# Patient Record
Sex: MALE | Race: WHITE | NOT HISPANIC OR LATINO | Employment: OTHER | URBAN - METROPOLITAN AREA
[De-identification: names, ages, dates, MRNs, and addresses within clinical notes are randomized per-mention and may not be internally consistent; named-entity substitution may affect disease eponyms.]

---

## 2021-01-13 DIAGNOSIS — Z23 NEED FOR VACCINATION: ICD-10-CM

## 2023-08-04 ENCOUNTER — TELEPHONE (OUTPATIENT)
Dept: CARDIOLOGY | Facility: MEDICAL CENTER | Age: 83
End: 2023-08-04
Payer: COMMERCIAL

## 2023-08-04 NOTE — TELEPHONE ENCOUNTER
Referral from: Cristine Aquino PAC for sev AS    Patient called on 8/4/2023. Called patient on mobile [phone] but unable to reach or LVM due to mailbox not set up. Home number [phone] is disconnected (removed from chart).     STAT Echo, CTA, and cath (if available) images/records requested from [id number]    First attempt

## 2023-08-04 NOTE — LETTER
August 8, 2023        Parvez Marmolejo  [address] [place] [address]        Dear Parvez:    We have been unable to reach you regarding a referral to our structural heart program for evaluation of your cardiac valvular disease, placed by Cristine PEOPLES.      Please call our office at [phone] to schedule a consultation.           Sincerely,           PEMA SimeonN, RN  Structural Heart Program Nurse Coordinator

## 2023-08-08 NOTE — TELEPHONE ENCOUNTER
Tried calling patient again but unable to reach or LVM due to mailbox not set up.    Called [facility] office at [phone] and spoke to Cristine to see if they have a better contact number for the patient. Unfortunately, they do not have a different number for the patient. Confirmed patient's address on file and advised I would mail him a letter. Also requested that Cristine place a note in the patient's referral that we have been unable to reach him to schedule. Left my direct number for Cristine to put in the notes in case the patient calls to check on the status.    Third attempt, letter mailed

## 2023-08-16 ENCOUNTER — HOSPITAL ENCOUNTER (OUTPATIENT)
Dept: RADIOLOGY | Facility: MEDICAL CENTER | Age: 83
End: 2023-08-16
Payer: COMMERCIAL

## 2023-08-17 NOTE — TELEPHONE ENCOUNTER
CTA images imported into Epic but unfortunately the echo images are at 0. Called OP filmroom to notify and was advised they'll work on it.

## 2023-08-22 NOTE — TELEPHONE ENCOUNTER
Received a call from [facility] inquiring if the patient was able to get scheduled. Advised I was unable to reach him/LVM x3 and I mailed a letter 8/8 without a response. She states they have been unable to reach him either. She states at this time, they are going to cancel the referral, so if the patient calls back to schedule, please direct him back to [facility] for another referral.

## 2023-10-26 ENCOUNTER — TELEPHONE (OUTPATIENT)
Dept: CARDIOLOGY | Facility: MEDICAL CENTER | Age: 83
End: 2023-10-26
Payer: COMMERCIAL

## 2023-10-26 DIAGNOSIS — I35.0 CRITICAL AORTIC VALVE STENOSIS: ICD-10-CM

## 2023-10-26 DIAGNOSIS — Z01.810 PRE-PROCEDURAL CARDIOVASCULAR EXAMINATION: ICD-10-CM

## 2023-10-26 NOTE — TELEPHONE ENCOUNTER
Referral from: Cristine Aquino PA-C for Severe AS    Patient called on 10/26/2023.    Called and left message for patient requesting call back to discuss.        First attempt

## 2023-10-31 NOTE — TELEPHONE ENCOUNTER
Received phone call from [facility] provider [facility] PEOPLES to schedule patient's appts. She states the patient is very hard of hearing, so she communicates with him via email.    Scheduled TAVR CTA, IC consult, and CTS consult. She will reach out if there are any concerns with the appts.     Patient had cath done 10/30, requested STAT images to be pushed into [employer]. Requested STAT lab results to be faxed. [facility] fax: [phone]

## 2023-10-31 NOTE — TELEPHONE ENCOUNTER
Received labs and cath consent (not report) from [facility], scanned into media.    Called [facility] and was advised they would send the cath report. They also said they didn't receive my cath image request. Re-faxed request.

## 2023-11-02 ENCOUNTER — TELEPHONE (OUTPATIENT)
Dept: CARDIOLOGY | Facility: MEDICAL CENTER | Age: 83
End: 2023-11-02
Payer: COMMERCIAL

## 2023-11-02 NOTE — TELEPHONE ENCOUNTER
Called patient in regards to records for NP appointment with Dr. MARTINEZ. To review most recent lab results, ekg, any cardiac testing or ,if patient has been treated by a cardiologist. No answer, left voicemail to call back      As well as finishing his epic profile

## 2023-11-02 NOTE — PROGRESS NOTES
REFERRING PHYSICIAN: Walter Esparza MD.     CONSULTING PHYSICIAN: Kourtney Anaya MD     CHIEF COMPLAINT: Fatigue    HISTORY OF PRESENT ILLNESS: The patient is a 83 y.o. male with a past medical history of cellulitis of scalp, PAD, aortic stenosis, hypertension, wheelchair bound, cervical spine stenosis who presents to the clinic to discuss his latest imaging.  He denies chest pain, shortness of breath, dizziness.  He has had fatigue for the last nine months.  Likely some of his symptoms have been masked due to being wheelchair bound per cardiology notes.  He has been seen by his cardiologist and underwent an echocardiogram which shows severe aortic stenosis.      PAST MEDICAL HISTORY:   Active Ambulatory Problems     Diagnosis Date Noted    Adjustment disorder with depressed mood 11/15/2023    Age-related physical debility 11/15/2023    Benign essential hypertension 11/15/2023    Cellulitis of head (any part, except face) 11/15/2023    Gastro-esophageal reflux disease without esophagitis 11/15/2023    Impaired cognition 11/15/2023    Paraplegia, incomplete (HCC) 11/15/2023    Critical aortic valve stenosis 11/15/2023     Resolved Ambulatory Problems     Diagnosis Date Noted    No Resolved Ambulatory Problems     No Additional Past Medical History       PAST SURGICAL HISTORY:   No previous chest surgeries     ALLERGIES: No Known Allergies     CURRENT MEDICATIONS:   Current Outpatient Medications:     omeprazole (PRILOSEC) 40 MG delayed-release capsule, Take 40 mg by mouth every day., Disp: , Rfl:     verapamil ER (CALAN SR) 240 MG Tab CR, Take 120 mg by mouth 2 times a day., Disp: , Rfl:     mirtazapine (REMERON) 15 MG Tab, Take 15 mg by mouth every evening., Disp: , Rfl:     docusate calcium (SURFAK) 240 MG Cap, Take 240 mg by mouth every day., Disp: , Rfl:     naproxen (NAPROSYN) 500 MG Tab, Take 500 mg by mouth as needed., Disp: , Rfl:     Multiple Vitamins-Minerals (ONE-A-DAY 50 PLUS PO), Take  by mouth.,  Disp: , Rfl:     Coenzyme Q10 (CO Q 10 PO), Take 200 mg by mouth every 48 hours. (Patient not taking: Reported on 11/15/2023), Disp: , Rfl:     FAMILY HISTORY: No family history of early CAD    SOCIAL HISTORY:   Social History     Socioeconomic History    Marital status:      Spouse name: Not on file    Number of children: Not on file    Years of education: Not on file    Highest education level: Not on file   Occupational History    Not on file   Tobacco Use    Smoking status: Never    Smokeless tobacco: Never   Vaping Use    Vaping Use: Not on file   Substance and Sexual Activity    Alcohol use: Not Currently    Drug use: Never    Sexual activity: Not on file   Other Topics Concern    Not on file   Social History Narrative    Not on file     Social Determinants of Health     Financial Resource Strain: Not on file   Food Insecurity: Not on file   Transportation Needs: Not on file   Physical Activity: Not on file   Stress: Not on file   Social Connections: Not on file   Intimate Partner Violence: Not on file   Housing Stability: Not on file       REVIEW OF SYSTEMS:  Review of Systems   Constitutional:  Positive for malaise/fatigue. Negative for chills, fever and weight loss.   HENT:  Negative for ear pain, nosebleeds and tinnitus.    Eyes:  Negative for double vision, photophobia and pain.   Respiratory:  Negative for cough, hemoptysis and shortness of breath.    Cardiovascular:  Negative for chest pain, palpitations, orthopnea, leg swelling and PND.   Gastrointestinal:  Negative for abdominal pain, blood in stool, nausea and vomiting.   Genitourinary:  Negative for frequency, hematuria and urgency.   Musculoskeletal: Negative.    Skin:  Negative for rash.   Neurological:  Negative for dizziness, tremors, speech change, focal weakness, seizures and headaches.        Wheelchair-bound   Endo/Heme/Allergies:  Negative for polydipsia. Does not bruise/bleed easily.   Psychiatric/Behavioral:  Negative for  "hallucinations and memory loss.          PHYSICAL EXAMINATION:    /68 (BP Location: Right arm, Patient Position: Sitting, BP Cuff Size: Adult)   Pulse 78   Temp 36.8 °C (98.2 °F) (Temporal)   Ht 1.854 m (6' 1\")   Wt 88.9 kg (196 lb)   SpO2 97%   BMI 25.86 kg/m²    Physical Exam  Vitals reviewed.   Constitutional:       General: He is not in acute distress.     Appearance: Normal appearance.      Comments: Wheelchair bound   HENT:      Head: Normocephalic and atraumatic.      Right Ear: External ear normal.      Left Ear: External ear normal.      Nose: Nose normal. No congestion.   Eyes:      General: No scleral icterus.     Extraocular Movements: Extraocular movements intact.      Conjunctiva/sclera: Conjunctivae normal.   Cardiovascular:      Rate and Rhythm: Normal rate and regular rhythm.   Pulmonary:      Effort: Pulmonary effort is normal. No respiratory distress.   Abdominal:      General: There is no distension.      Palpations: Abdomen is soft.   Musculoskeletal:      Cervical back: Normal range of motion.      Comments: Wheel chair bound   Skin:     General: Skin is warm and dry.      Coloration: Skin is not jaundiced.      Findings: No rash.   Neurological:      Mental Status: He is alert and oriented to person, place, and time.      Cranial Nerves: No cranial nerve deficit.   Psychiatric:         Mood and Affect: Mood normal.         Behavior: Behavior normal.         LABS REVIEWED:  No results found for: \"SODIUM\", \"POTASSIUM\", \"CHLORIDE\", \"CO2\", \"GLUCOSE\", \"BUN\", \"CREATININE\", \"BUNCREATRAT\", \"GLOMRATE\"   No results found for: \"PROTHROMBTM\", \"INR\"   No results found for: \"WBC\", \"RBC\", \"HEMOGLOBIN\", \"HEMATOCRIT\", \"MCV\", \"MCH\", \"MCHC\", \"MPV\", \"NEUTSPOLYS\", \"LYMPHOCYTES\", \"MONOCYTES\", \"EOSINOPHILS\", \"BASOPHILS\", \"HYPOCHROMIA\", \"ANISOCYTOSIS\"     IMAGING REVIEWED AND INTERPRETED:    ECHOCARDIOGRAM   [facility]23 [facility]  Left ventricle: Left ventricle is normal in size.  There is mild concentric left " ventricular hypertrophy.  No overt wall motion abnormality noted.  The left ventricular ejection fraction is normal.  The ejection fraction estimate is 55 to 60%.  Transmitral Doppler suggests abnormal diastolic relaxation.  Left atrium: The left atrium is mildly dilated  Mitral valve: The mitral valve leaflets appear thickened, but open adequately.  There is mitral annular thickening.  There is trace mitral regurgitation.  There is no vegetation seen on the mitral valve  Aortic valve: The aortic valve is thickened with some reduced mobility.  No aortic regurgitation is present.  There is severe aortic stenosis.  The aortic valve peak velocity measured 5.0 m/s.  AV mean pressure gradient is 69 mmHg.  Aortic valve area measured 0.6 to 0.7 cm² there is no aortic valvular vegetation.  Right ventricle: The right ventricle is grossly normal in size with normal contractility.  Right atrium: The right atrium is normal in size.  Tricuspid valve: There is insufficient sufficient by cuspid regurgitation to calculate pulmonary artery pressures.    CARDIAC CATHETERIZATION   10/30/2023 [facility]  Findings:  Normal left ventricular systolic function  Severe aortic stenosis with mean gradient of 56 mmHg  Nonobstructive coronary artery disease with sequential 40 to 50% obstruction in proximal and mid LAD, and 60% right coronary stenosis    CT SCAN CHEST   7/26/2023  Impression:  VARUN protocol CTA gated during systole demonstrated mild to moderate aortic valve and aortic atherosclerosis.  Centerline analysis of the peripheral axis demonstrates mild to moderate tortuosity of the iliac arteries.  Mild tracheal right sidewall nonspecific nodule which may represent a mucous plug.  Recommend attention to this area and follow-up chest CT imaging to ensure resolution.  Right lower lobe focal linear parenchymal scar.  Nonspecific smooth pleural enhancement posteriorly of unknown etiology or clinical significance.  No pleural  effusion.  Cholelithiasis.  No evidence for acute cholecystitis  Right lobe liver inferior margin subcapsular hyperenhancing 1.5 cm structure.  This is incompletely evaluated.  Possible etiologies include both benign hemangioma and malignant processes.  Recommend further evaluation with dedicated liver CT scan.  Alternatively if the patient's renal function is impaired and can also consider performing a dedicated focused liver ultrasound examination at the area of interest given its shallow posterior lateral location.  Left renal pelvis nonobstructing large stones.      IMPRESSION:  84 yo gentleman with known conditions of PAD, hypertension, wheelchair bound, cervical spine stenosis now with critical symptomatic aortic stenosis      PLAN:  I recommend that the patient is a reasonable TAVR candidate given extreme fraility, advanced age and preference. We will continue to follow workup and discuss in multidisciplinary conference.    The operative mortality risk is approximately 8%. The STS mortality risk score is 2% which greatly underestimates his true risk as it doesn't account for his degree of debilitation and wheelchair bound status and the morbidity and mortality risk score is 6.7%. The scores were discussed with patient.    Thank you for this very challenging consultation and participation in the patient’s care.  I will keep you apprised of all future developments.      Sincerely,     Kourtney Anaya MD

## 2023-11-02 NOTE — TELEPHONE ENCOUNTER
Late documentation: cath report received 10/31 and scanned into the patient's chart.     Received phone call today from referring cardiologist Cristine PEOPLES. She states she notified the patient of his upcoming appt times, and he is unable to attend AM appts. Advised Cristine our CTAs are only in the morning. She said she will coordinate a ride for the patient and make it work.

## 2023-11-08 ENCOUNTER — HOSPITAL ENCOUNTER (OUTPATIENT)
Dept: RADIOLOGY | Facility: MEDICAL CENTER | Age: 83
End: 2023-11-08
Payer: COMMERCIAL

## 2023-11-15 ENCOUNTER — OFFICE VISIT (OUTPATIENT)
Dept: CARDIOTHORACIC SURGERY | Facility: MEDICAL CENTER | Age: 83
End: 2023-11-15
Payer: COMMERCIAL

## 2023-11-15 ENCOUNTER — OFFICE VISIT (OUTPATIENT)
Dept: CARDIOLOGY | Facility: MEDICAL CENTER | Age: 83
End: 2023-11-15
Attending: INTERNAL MEDICINE
Payer: COMMERCIAL

## 2023-11-15 ENCOUNTER — HOSPITAL ENCOUNTER (OUTPATIENT)
Dept: RADIOLOGY | Facility: MEDICAL CENTER | Age: 83
End: 2023-11-15
Payer: COMMERCIAL

## 2023-11-15 ENCOUNTER — DOCUMENTATION (OUTPATIENT)
Dept: CARDIOLOGY | Facility: MEDICAL CENTER | Age: 83
End: 2023-11-15

## 2023-11-15 VITALS
HEIGHT: 73 IN | SYSTOLIC BLOOD PRESSURE: 104 MMHG | HEART RATE: 67 BPM | WEIGHT: 196 LBS | DIASTOLIC BLOOD PRESSURE: 70 MMHG | OXYGEN SATURATION: 96 % | RESPIRATION RATE: 16 BRPM | BODY MASS INDEX: 25.98 KG/M2

## 2023-11-15 VITALS
DIASTOLIC BLOOD PRESSURE: 68 MMHG | BODY MASS INDEX: 25.98 KG/M2 | TEMPERATURE: 98.2 F | SYSTOLIC BLOOD PRESSURE: 108 MMHG | OXYGEN SATURATION: 97 % | HEIGHT: 73 IN | WEIGHT: 196 LBS | HEART RATE: 78 BPM

## 2023-11-15 DIAGNOSIS — I35.0 CRITICAL AORTIC VALVE STENOSIS: ICD-10-CM

## 2023-11-15 DIAGNOSIS — I73.9 PAD (PERIPHERAL ARTERY DISEASE) (HCC): ICD-10-CM

## 2023-11-15 DIAGNOSIS — Z01.810 PRE-PROCEDURAL CARDIOVASCULAR EXAMINATION: ICD-10-CM

## 2023-11-15 DIAGNOSIS — Z00.6 EXAMINATION OF PARTICIPANT IN CLINICAL TRIAL: ICD-10-CM

## 2023-11-15 DIAGNOSIS — I35.0 NONRHEUMATIC AORTIC (VALVE) STENOSIS: ICD-10-CM

## 2023-11-15 DIAGNOSIS — I47.10 PSVT (PAROXYSMAL SUPRAVENTRICULAR TACHYCARDIA) (HCC): ICD-10-CM

## 2023-11-15 DIAGNOSIS — I35.0 SEVERE AORTIC STENOSIS: ICD-10-CM

## 2023-11-15 DIAGNOSIS — G82.22 PARAPLEGIA, INCOMPLETE (HCC): ICD-10-CM

## 2023-11-15 PROBLEM — K21.9 GASTRO-ESOPHAGEAL REFLUX DISEASE WITHOUT ESOPHAGITIS: Status: ACTIVE | Noted: 2023-11-15

## 2023-11-15 PROBLEM — R54 AGE-RELATED PHYSICAL DEBILITY: Status: ACTIVE | Noted: 2023-11-15

## 2023-11-15 PROBLEM — I10 BENIGN ESSENTIAL HYPERTENSION: Status: ACTIVE | Noted: 2023-11-15

## 2023-11-15 PROBLEM — R41.89 IMPAIRED COGNITION: Status: ACTIVE | Noted: 2023-11-15

## 2023-11-15 PROBLEM — L03.811: Status: ACTIVE | Noted: 2023-11-15

## 2023-11-15 PROBLEM — F43.21 ADJUSTMENT DISORDER WITH DEPRESSED MOOD: Status: ACTIVE | Noted: 2023-11-15

## 2023-11-15 LAB — EKG IMPRESSION: NORMAL

## 2023-11-15 PROCEDURE — 71275 CT ANGIOGRAPHY CHEST: CPT

## 2023-11-15 PROCEDURE — 99213 OFFICE O/P EST LOW 20 MIN: CPT | Mod: 25 | Performed by: INTERNAL MEDICINE

## 2023-11-15 PROCEDURE — 93005 ELECTROCARDIOGRAM TRACING: CPT | Performed by: INTERNAL MEDICINE

## 2023-11-15 PROCEDURE — 3078F DIAST BP <80 MM HG: CPT | Performed by: INTERNAL MEDICINE

## 2023-11-15 PROCEDURE — 99205 OFFICE O/P NEW HI 60 MIN: CPT | Performed by: THORACIC SURGERY (CARDIOTHORACIC VASCULAR SURGERY)

## 2023-11-15 PROCEDURE — 99205 OFFICE O/P NEW HI 60 MIN: CPT | Performed by: INTERNAL MEDICINE

## 2023-11-15 PROCEDURE — 3074F SYST BP LT 130 MM HG: CPT | Performed by: THORACIC SURGERY (CARDIOTHORACIC VASCULAR SURGERY)

## 2023-11-15 PROCEDURE — 93010 ELECTROCARDIOGRAM REPORT: CPT | Performed by: INTERNAL MEDICINE

## 2023-11-15 PROCEDURE — 3078F DIAST BP <80 MM HG: CPT | Performed by: THORACIC SURGERY (CARDIOTHORACIC VASCULAR SURGERY)

## 2023-11-15 PROCEDURE — 700117 HCHG RX CONTRAST REV CODE 255

## 2023-11-15 PROCEDURE — 3074F SYST BP LT 130 MM HG: CPT | Performed by: INTERNAL MEDICINE

## 2023-11-15 RX ORDER — NAPROXEN 500 MG/1
500 TABLET ORAL 2 TIMES DAILY PRN
COMMUNITY

## 2023-11-15 RX ORDER — DOCUSATE CALCIUM 240 MG
240 CAPSULE ORAL DAILY
COMMUNITY

## 2023-11-15 RX ORDER — MIRTAZAPINE 15 MG/1
15 TABLET, FILM COATED ORAL NIGHTLY
COMMUNITY
Start: 2023-07-21

## 2023-11-15 RX ORDER — VERAPAMIL HYDROCHLORIDE 240 MG/1
120 TABLET, FILM COATED, EXTENDED RELEASE ORAL
COMMUNITY
Start: 2023-08-28

## 2023-11-15 RX ORDER — OMEPRAZOLE 40 MG/1
40 CAPSULE, DELAYED RELEASE ORAL 2 TIMES DAILY
COMMUNITY
Start: 2023-09-26

## 2023-11-15 RX ADMIN — IOHEXOL 100 ML: 350 INJECTION, SOLUTION INTRAVENOUS at 10:31

## 2023-11-15 ASSESSMENT — ENCOUNTER SYMPTOMS
EYE PAIN: 0
PALPITATIONS: 0
PHOTOPHOBIA: 0
POLYDIPSIA: 0
FEVER: 0
HALLUCINATIONS: 0
SHORTNESS OF BREATH: 0
SPEECH CHANGE: 0
FOCAL WEAKNESS: 0
MEMORY LOSS: 0
DOUBLE VISION: 0
WEIGHT LOSS: 0
HEMOPTYSIS: 0
CHILLS: 0
COUGH: 0
VOMITING: 0
PND: 0
SEIZURES: 0
TREMORS: 0
ORTHOPNEA: 0
HEADACHES: 0
MUSCULOSKELETAL NEGATIVE: 1
BRUISES/BLEEDS EASILY: 0
NAUSEA: 0
ABDOMINAL PAIN: 0
DIZZINESS: 0
BLOOD IN STOOL: 0

## 2023-11-15 ASSESSMENT — PATIENT HEALTH QUESTIONNAIRE - PHQ9
5. POOR APPETITE OR OVEREATING: 0 - NOT AT ALL
CLINICAL INTERPRETATION OF PHQ2 SCORE: 1
SUM OF ALL RESPONSES TO PHQ QUESTIONS 1-9: 2

## 2023-11-15 NOTE — PROGRESS NOTES
CARDIOLOGY STRUCTURAL HEART CONSULTATION    PCP: Pcp Unobtainable By   [facility]    1. Critical aortic valve stenosis    2. PSVT (paroxysmal supraventricular tachycardia)    3. Paraplegia, incomplete (HCC)    4. PAD (peripheral artery disease) ([facility])        Parvez Marmolejo has class I stage C (likely with masked symptoms due to physical debility) very severe aortic stenosis.  I advised proceeding with aortic valve replacement.  While SAVR is generally preferred for this indication, I suspect his physical debility will pose undue  operative risk and therefor TAVR is a reasonable alternative. I will need to review the CT scan carefully to ensure that femoral access will be feasible.    We discussed the risks, benefits and alternatives to TAVR including but not limited to a 10% risk of pacemaker, 5% risk of bleeding/access site complication, 2% risk of stroke, risk of contrast nephropathy and 2% risk of mortality. The patient is in agreement with proceeding. He is at elevated risk due to frailty and PAD.      Follow up: 6 weeks    History: Parvez Marmolejo is a 83 y.o. 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  An echocardiogram showed a mean gradient of 69 mmHg, V-max 5 m/s with severely degenerated aortic valve and restricted opening.  The mean gradient was measured at 56 mmHg during invasive assessment.  A CT scan did raise the possibility of small iliac arteries -though my personal interpretation of the images does not suggest there will be a significant problem.      He did denies any specific symptoms attributable to the aortic valve but does is largely sedentary on account of spinal stenosis.      ROS:   10 point review  systems is otherwise negative except as per the HPI    PE:  /70 (BP Location:  "Left arm, Patient Position: Sitting, BP Cuff Size: Adult)   Pulse 67   Resp 16   Ht 1.854 m (6' 1\")   Wt 88.9 kg (196 lb)   SpO2 96%   BMI 25.86 kg/m²   GEN: NAD  CARDIAC: Regular Systolic ejection murmur Normal S1, S2  RESP: Clear to auscultation bilaterally  ABD: Soft, non-tender, non-distended  EXT: No edema  NEURO: No focal deficit    History reviewed. No pertinent past medical history.  History reviewed. No pertinent surgical history.  No Known Allergies  Outpatient Encounter Medications as of 11/15/2023   Medication Sig Dispense Refill    omeprazole (PRILOSEC) 40 MG delayed-release capsule Take 40 mg by mouth every day.      verapamil ER (CALAN SR) 240 MG Tab CR Take 120 mg by mouth 2 times a day.      mirtazapine (REMERON) 15 MG Tab Take 15 mg by mouth every evening.      docusate calcium (SURFAK) 240 MG Cap Take 240 mg by mouth every day.      naproxen (NAPROSYN) 500 MG Tab Take 500 mg by mouth as needed.      Coenzyme Q10 (CO Q 10 PO) Take 200 mg by mouth every 48 hours.      Multiple Vitamins-Minerals (ONE-A-DAY 50 PLUS PO) Take  by mouth.       No facility-administered encounter medications on file as of 11/15/2023.     Social History     Socioeconomic History    Marital status:      Spouse name: Not on file    Number of children: Not on file    Years of education: Not on file    Highest education level: Not on file   Occupational History    Not on file   Tobacco Use    Smoking status: Never    Smokeless tobacco: Never   Substance and Sexual Activity    Alcohol use: Not Currently    Drug use: Never    Sexual activity: Not on file   Other Topics Concern    Not on file   Social History Narrative    Not on file     Social Determinants of Health     Financial Resource Strain: Not on file   Food Insecurity: Not on file   Transportation Needs: Not on file   Physical Activity: Not on file   Stress: Not on file   Social Connections: Not on file   Intimate Partner Violence: Not on file   Housing " "Stability: Not on file     History reviewed. No pertinent family history.      Studies  No results found for: \"CHOLSTRLTOT\", \"LDL\", \"HDL\", \"TRIGLYCERIDE\"    No results found for: \"SODIUM\", \"POTASSIUM\", \"CHLORIDE\", \"CO2\", \"GLUCOSE\", \"BUN\", \"CREATININE\", \"BUNCREATRAT\", \"GLOMRATE\"  No results found for: \"ALKPHOSPHAT\", \"ASTSGOT\", \"ALTSGPT\", \"TBILIRUBIN\"     Today we discussed decision making regarding major surgery with elevated patient risk factors PAD, frailty and Today's encounter addressed the patients illness which poses a threat to life/bodily function -severe aortic stenosis  "

## 2023-11-15 NOTE — PROGRESS NOTES
Valve Program Functional Assessment:     KCCQ12   1a) Showering/bathin  1b) Walking 1 block on ground: 6  1c) Hurrying or joggin  2) Swellin  3) Fatigue: 7  4) Shortness of breath: 7  5) Sleep sitting up: 5  6) Limited enjoyment of life: 5  7) Spend the rest of your life with HF: 5  8a) Hobbies, recreational activities:6  8b) Working or doing household chores:6  8c) Visiting family or friends: 6    5 meter walk test  1) __n/a____ s/5m  2) __n/a____ s/5m  3) __n/a____ s/5m  AVG:_pt can not walk and is in a wheelchair ______     Strength   1) _12_____ kg  2) _12_____ kg  3) _16_____ kg  AVG:__13.3____    LANGLEY ADLs  Patient independently preforms...   - Bathing: Yes   - Dressing: Yes   - Toileting: Yes   - Transferring: Yes   - Continence: Yes   - Feeding: Yes   Total Score: _3_/6    Living Situation  Patient lives: alone    Mobility Aids   Patient uses:  wheelchair    FRAILTY SCORE: _3_/ 4

## 2023-11-15 NOTE — PROGRESS NOTES
"Valve Program Consultation: 11/15/2023 for tentative TAVR    Mental Status Assessment:  Appearance: normal  Behavior: normal  Mood/Affect: normal  Thought process/content: slightly limited due to hearing loss  Cognition: slightly limited  Functional ability: slightly limited; patient uses wheelchair  Dental Concerns: natural teeth with dentures; patient denies s/s of active oral infection/irritation  Further mental assessment needed: yes, mini mental exam completed by Madison JULIEN; score of 27    Post-op Plan of Care:  Support systems: Patient alone at appointment today; son in [place], but patient declines RN reaching out to review procedure details; [facility]coord[facility] who assists with documents   Patient understands discharge plan: yes, with reinforcement  DME: wheelchair, walker, hearing aids, wears glasses  Home health warranted prior to procedure: Patient received HH up until 2 weeks ago. He has discussed another referral with primary cardiologist Cristine Linton PA-C and it was determined she would refer patient following TAVR. Patient states he will be ok in the interim as he is very self sufficient, just slow.  Social concerns for discharge: patient has limited social support   PCP alerted of social concerns: n/a  POLST: none  Advance directive: [facility]; advised to bring copy for Epic chart  Post-op goal: \"I don't want to suffer\"  Medication instructions: Hold all vitamins and supplements 7 days prior, hold Naproxen 5 days prior, patient may take stool softener.    Concerns prior to procedure: Patient extremely hard of hearing and prefers to communicate via email as much as possible. Limited social support to help with appointment details.     Met with patient during New TAVR consult.     All patient's questions and concerns were addressed during this visit. They understood pre-operative and post-operative plan of care.    Reviewed patient TAVR education packet explaining that " information is provided regarding preparation for TAVR the night prior, what to expect during the hospital stay, average LOS, and what to look out for post TAVR discharge. Explained that patient will require SBE prophylactic antibiotic prior to any dental treatment post TAVR. Advised patient not to receive any new vaccinations within 1 week pre- and 1 week post-procedure.     Explained that patient should not eat or drink anything past midnight the day of the procedure. Encouraged patient to wear something clean and comfortable, easy to get on/off to check in Monday morning, time TBD. Patient may have friends and family in the pre-operational area until patient is taken to the operating room, at which time family and friends will be asked to wait on floor 1 [facility] surgical waiting area. On completion of TAVR, heart team will update family. Once update is given, there is some time before family/friends may visit patient in their assigned room. Length of stay on average is one night, however patient may stay longer depending on specific needs at that time. Patient given printed instructions sheet with all above stated instructions. Patient states understanding of all material and education presented today and has no further questions at this time. Encouraged patient again, to contact me with any questions or concerns during this work-up process. Patient states understanding.

## 2023-11-20 ENCOUNTER — DOCUMENTATION (OUTPATIENT)
Dept: CARDIOLOGY | Facility: MEDICAL CENTER | Age: 83
End: 2023-11-20
Payer: COMMERCIAL

## 2023-11-22 ENCOUNTER — TELEPHONE (OUTPATIENT)
Dept: CARDIOLOGY | Facility: MEDICAL CENTER | Age: 83
End: 2023-11-22
Payer: COMMERCIAL

## 2023-11-22 DIAGNOSIS — I35.0 NONRHEUMATIC AORTIC (VALVE) STENOSIS: ICD-10-CM

## 2023-11-22 NOTE — TELEPHONE ENCOUNTER
Patient notified of procedure date/time and check in process.     All questions were answered. Patient has direct extension for any further questions/concerns prior to the procedure.

## 2023-11-22 NOTE — TELEPHONE ENCOUNTER
Pre TAVR notification letter generated and electronically faxed ([phone]) to primary cardiologist Cristine Linton PA-C.

## 2023-11-22 NOTE — TELEPHONE ENCOUNTER
Valve Conference Plan of Care: 11/22/2023 for TAVR 11/27/2023           TAVR Candidate: Yes  Access: right femoral  Valve Size: 26mm vs 29mm  General Anesthesia or MAC: GA with GRIFFIN  Unit: Telemetry  Incidental findings to be discussed with PCP: Distended gallbladder with numerous gallstones and gallbladder wall thickening. Correlate clinically for chronic cholecystitis.   Nonobstructive left renal calculi. Splenomegaly.   Clearance needed prior to procedure: No    Check in time of 0700 11/27/2023.     Pre-op appointment completed: No    NPO after midnight. Hold all vitamins and supplements 7 days prior, hold Naproxen 5 days prior to procedure.     Patient will need NIHSS completed in pre-op.

## 2023-11-22 NOTE — LETTER
[facility]   [address], [address]  [place] [address]  Phone: [phone]  Fax: [phone]              Patient:              Parvez Marmolejo  YOB: 1940        Dear Cristine,      Thank you for the opportunity to participate in your patient's care.     Your patient is scheduled for transcatheter aortic valve replacement (TAVR) on   Monday, November 27, 2023.    Sincerely,    [facility] Structural Heart Team    NOBLE Abreu, RN, Structural Heart Program Nurse Coordinator ([phone])  NOBLE Oates, RN, Structural Heart Program Nurse Coordinator ([phone])

## 2023-11-27 ENCOUNTER — HOSPITAL ENCOUNTER (INPATIENT)
Facility: MEDICAL CENTER | Age: 83
LOS: 1 days | DRG: 266 | End: 2023-11-28
Attending: INTERNAL MEDICINE | Admitting: INTERNAL MEDICINE
Payer: COMMERCIAL

## 2023-11-27 ENCOUNTER — ANESTHESIA (OUTPATIENT)
Dept: SURGERY | Facility: MEDICAL CENTER | Age: 83
DRG: 266 | End: 2023-11-27
Payer: COMMERCIAL

## 2023-11-27 ENCOUNTER — ANESTHESIA EVENT (OUTPATIENT)
Dept: SURGERY | Facility: MEDICAL CENTER | Age: 83
DRG: 266 | End: 2023-11-27
Payer: COMMERCIAL

## 2023-11-27 ENCOUNTER — APPOINTMENT (OUTPATIENT)
Dept: CARDIOLOGY | Facility: MEDICAL CENTER | Age: 83
DRG: 266 | End: 2023-11-27
Attending: ANESTHESIOLOGY
Payer: COMMERCIAL

## 2023-11-27 ENCOUNTER — APPOINTMENT (OUTPATIENT)
Dept: RADIOLOGY | Facility: MEDICAL CENTER | Age: 83
DRG: 266 | End: 2023-11-27
Payer: COMMERCIAL

## 2023-11-27 DIAGNOSIS — Z95.2 S/P TAVR (TRANSCATHETER AORTIC VALVE REPLACEMENT): ICD-10-CM

## 2023-11-27 DIAGNOSIS — R54 AGE-RELATED PHYSICAL DEBILITY: ICD-10-CM

## 2023-11-27 DIAGNOSIS — I50.31 ACUTE DIASTOLIC HF (HEART FAILURE) (HCC): ICD-10-CM

## 2023-11-27 LAB
ABO + RH BLD: NORMAL
ABO GROUP BLD: NORMAL
ACT BLD: 309 SEC (ref 74–137)
ALBUMIN SERPL BCP-MCNC: 3.1 G/DL (ref 3.2–4.9)
ALBUMIN SERPL BCP-MCNC: 3.9 G/DL (ref 3.2–4.9)
ALBUMIN/GLOB SERPL: 1.1 G/DL
ALBUMIN/GLOB SERPL: 1.2 G/DL
ALP SERPL-CCNC: 63 U/L (ref 30–99)
ALP SERPL-CCNC: 75 U/L (ref 30–99)
ALT SERPL-CCNC: 9 U/L (ref 2–50)
ALT SERPL-CCNC: 9 U/L (ref 2–50)
ANION GAP SERPL CALC-SCNC: 10 MMOL/L (ref 7–16)
ANION GAP SERPL CALC-SCNC: 9 MMOL/L (ref 7–16)
APTT PPP: 35.5 SEC (ref 24.7–36)
AST SERPL-CCNC: 12 U/L (ref 12–45)
AST SERPL-CCNC: 15 U/L (ref 12–45)
BILIRUB SERPL-MCNC: 0.4 MG/DL (ref 0.1–1.5)
BILIRUB SERPL-MCNC: 0.6 MG/DL (ref 0.1–1.5)
BLD GP AB SCN SERPL QL: NORMAL
BUN SERPL-MCNC: 11 MG/DL (ref 8–22)
BUN SERPL-MCNC: 12 MG/DL (ref 8–22)
CALCIUM ALBUM COR SERPL-MCNC: 8.9 MG/DL (ref 8.5–10.5)
CALCIUM ALBUM COR SERPL-MCNC: 9.1 MG/DL (ref 8.5–10.5)
CALCIUM SERPL-MCNC: 8.2 MG/DL (ref 8.5–10.5)
CALCIUM SERPL-MCNC: 9 MG/DL (ref 8.5–10.5)
CHLORIDE SERPL-SCNC: 104 MMOL/L (ref 96–112)
CHLORIDE SERPL-SCNC: 107 MMOL/L (ref 96–112)
CO2 SERPL-SCNC: 22 MMOL/L (ref 20–33)
CO2 SERPL-SCNC: 26 MMOL/L (ref 20–33)
CREAT SERPL-MCNC: 0.67 MG/DL (ref 0.5–1.4)
CREAT SERPL-MCNC: 0.81 MG/DL (ref 0.5–1.4)
EKG IMPRESSION: NORMAL
EKG IMPRESSION: NORMAL
ERYTHROCYTE [DISTWIDTH] IN BLOOD BY AUTOMATED COUNT: 42 FL (ref 35.9–50)
ERYTHROCYTE [DISTWIDTH] IN BLOOD BY AUTOMATED COUNT: 42.4 FL (ref 35.9–50)
GFR SERPLBLD CREATININE-BSD FMLA CKD-EPI: 88 ML/MIN/1.73 M 2
GFR SERPLBLD CREATININE-BSD FMLA CKD-EPI: 93 ML/MIN/1.73 M 2
GLOBULIN SER CALC-MCNC: 2.8 G/DL (ref 1.9–3.5)
GLOBULIN SER CALC-MCNC: 3.2 G/DL (ref 1.9–3.5)
GLUCOSE SERPL-MCNC: 106 MG/DL (ref 65–99)
GLUCOSE SERPL-MCNC: 98 MG/DL (ref 65–99)
HCT VFR BLD AUTO: 36.2 % (ref 42–52)
HCT VFR BLD AUTO: 41.6 % (ref 42–52)
HGB BLD-MCNC: 12.2 G/DL (ref 14–18)
HGB BLD-MCNC: 13.8 G/DL (ref 14–18)
INR PPP: 1.09 (ref 0.87–1.13)
LV EJECT FRACT  99904: 50
MCH RBC QN AUTO: 28.3 PG (ref 27–33)
MCH RBC QN AUTO: 28.8 PG (ref 27–33)
MCHC RBC AUTO-ENTMCNC: 33.2 G/DL (ref 32.3–36.5)
MCHC RBC AUTO-ENTMCNC: 33.7 G/DL (ref 32.3–36.5)
MCV RBC AUTO: 85.2 FL (ref 81.4–97.8)
MCV RBC AUTO: 85.4 FL (ref 81.4–97.8)
NT-PROBNP SERPL IA-MCNC: 114 PG/ML (ref 0–125)
NT-PROBNP SERPL IA-MCNC: 117 PG/ML (ref 0–125)
PLATELET # BLD AUTO: 188 K/UL (ref 164–446)
PLATELET # BLD AUTO: 243 K/UL (ref 164–446)
PMV BLD AUTO: 8.9 FL (ref 9–12.9)
PMV BLD AUTO: 9.1 FL (ref 9–12.9)
POTASSIUM SERPL-SCNC: 3.7 MMOL/L (ref 3.6–5.5)
POTASSIUM SERPL-SCNC: 3.9 MMOL/L (ref 3.6–5.5)
PROT SERPL-MCNC: 5.9 G/DL (ref 6–8.2)
PROT SERPL-MCNC: 7.1 G/DL (ref 6–8.2)
PROTHROMBIN TIME: 14.2 SEC (ref 12–14.6)
RBC # BLD AUTO: 4.24 M/UL (ref 4.7–6.1)
RBC # BLD AUTO: 4.88 M/UL (ref 4.7–6.1)
RH BLD: NORMAL
SODIUM SERPL-SCNC: 139 MMOL/L (ref 135–145)
SODIUM SERPL-SCNC: 139 MMOL/L (ref 135–145)
WBC # BLD AUTO: 8.4 K/UL (ref 4.8–10.8)
WBC # BLD AUTO: 8.4 K/UL (ref 4.8–10.8)

## 2023-11-27 PROCEDURE — C1883 ADAPT/EXT, PACING/NEURO LEAD: HCPCS | Performed by: INTERNAL MEDICINE

## 2023-11-27 PROCEDURE — 700105 HCHG RX REV CODE 258: Performed by: INTERNAL MEDICINE

## 2023-11-27 PROCEDURE — 700111 HCHG RX REV CODE 636 W/ 250 OVERRIDE (IP): Mod: JZ

## 2023-11-27 PROCEDURE — 700101 HCHG RX REV CODE 250: Performed by: ANESTHESIOLOGY

## 2023-11-27 PROCEDURE — 86900 BLOOD TYPING SEROLOGIC ABO: CPT

## 2023-11-27 PROCEDURE — 700102 HCHG RX REV CODE 250 W/ 637 OVERRIDE(OP)

## 2023-11-27 PROCEDURE — 86850 RBC ANTIBODY SCREEN: CPT

## 2023-11-27 PROCEDURE — 93005 ELECTROCARDIOGRAM TRACING: CPT

## 2023-11-27 PROCEDURE — 93355 ECHO TRANSESOPHAGEAL (TEE): CPT

## 2023-11-27 PROCEDURE — 502240 HCHG MISC OR SUPPLY RC 0272: Performed by: INTERNAL MEDICINE

## 2023-11-27 PROCEDURE — 36415 COLL VENOUS BLD VENIPUNCTURE: CPT

## 2023-11-27 PROCEDURE — 160036 HCHG PACU - EA ADDL 30 MINS PHASE I: Performed by: INTERNAL MEDICINE

## 2023-11-27 PROCEDURE — 83880 ASSAY OF NATRIURETIC PEPTIDE: CPT

## 2023-11-27 PROCEDURE — 85027 COMPLETE CBC AUTOMATED: CPT

## 2023-11-27 PROCEDURE — C1887 CATHETER, GUIDING: HCPCS | Performed by: INTERNAL MEDICINE

## 2023-11-27 PROCEDURE — 71045 X-RAY EXAM CHEST 1 VIEW: CPT

## 2023-11-27 PROCEDURE — 02RF38Z REPLACEMENT OF AORTIC VALVE WITH ZOOPLASTIC TISSUE, PERCUTANEOUS APPROACH: ICD-10-PCS | Performed by: THORACIC SURGERY (CARDIOTHORACIC VASCULAR SURGERY)

## 2023-11-27 PROCEDURE — 33361 REPLACE AORTIC VALVE PERQ: CPT | Mod: 62,Q0 | Performed by: THORACIC SURGERY (CARDIOTHORACIC VASCULAR SURGERY)

## 2023-11-27 PROCEDURE — 33361 REPLACE AORTIC VALVE PERQ: CPT | Mod: 62,Q0 | Performed by: INTERNAL MEDICINE

## 2023-11-27 PROCEDURE — 80053 COMPREHEN METABOLIC PANEL: CPT

## 2023-11-27 PROCEDURE — 85347 COAGULATION TIME ACTIVATED: CPT

## 2023-11-27 PROCEDURE — 93010 ELECTROCARDIOGRAM REPORT: CPT | Performed by: INTERNAL MEDICINE

## 2023-11-27 PROCEDURE — 85610 PROTHROMBIN TIME: CPT

## 2023-11-27 PROCEDURE — 160042 HCHG SURGERY MINUTES - EA ADDL 1 MIN LEVEL 5: Performed by: INTERNAL MEDICINE

## 2023-11-27 PROCEDURE — C1751 CATH, INF, PER/CENT/MIDLINE: HCPCS | Performed by: INTERNAL MEDICINE

## 2023-11-27 PROCEDURE — 93010 ELECTROCARDIOGRAM REPORT: CPT | Mod: 76 | Performed by: INTERNAL MEDICINE

## 2023-11-27 PROCEDURE — 700101 HCHG RX REV CODE 250: Performed by: INTERNAL MEDICINE

## 2023-11-27 PROCEDURE — 700111 HCHG RX REV CODE 636 W/ 250 OVERRIDE (IP): Performed by: INTERNAL MEDICINE

## 2023-11-27 PROCEDURE — 160009 HCHG ANES TIME/MIN: Performed by: INTERNAL MEDICINE

## 2023-11-27 PROCEDURE — 160002 HCHG RECOVERY MINUTES (STAT): Performed by: INTERNAL MEDICINE

## 2023-11-27 PROCEDURE — 86901 BLOOD TYPING SEROLOGIC RH(D): CPT

## 2023-11-27 PROCEDURE — 503001 HCHG PERFUSION: Performed by: INTERNAL MEDICINE

## 2023-11-27 PROCEDURE — 160048 HCHG OR STATISTICAL LEVEL 1-5: Performed by: INTERNAL MEDICINE

## 2023-11-27 PROCEDURE — 110372 HCHG SHELL REV 278: Performed by: INTERNAL MEDICINE

## 2023-11-27 PROCEDURE — 700111 HCHG RX REV CODE 636 W/ 250 OVERRIDE (IP): Mod: JZ | Performed by: ANESTHESIOLOGY

## 2023-11-27 PROCEDURE — 160035 HCHG PACU - 1ST 60 MINS PHASE I: Performed by: INTERNAL MEDICINE

## 2023-11-27 PROCEDURE — 160031 HCHG SURGERY MINUTES - 1ST 30 MINS LEVEL 5: Performed by: INTERNAL MEDICINE

## 2023-11-27 PROCEDURE — 93005 ELECTROCARDIOGRAM TRACING: CPT | Performed by: INTERNAL MEDICINE

## 2023-11-27 PROCEDURE — C1769 GUIDE WIRE: HCPCS | Performed by: INTERNAL MEDICINE

## 2023-11-27 PROCEDURE — 85730 THROMBOPLASTIN TIME PARTIAL: CPT

## 2023-11-27 PROCEDURE — C1760 CLOSURE DEV, VASC: HCPCS | Performed by: INTERNAL MEDICINE

## 2023-11-27 PROCEDURE — C1725 CATH, TRANSLUMIN NON-LASER: HCPCS | Performed by: INTERNAL MEDICINE

## 2023-11-27 PROCEDURE — C1894 INTRO/SHEATH, NON-LASER: HCPCS | Performed by: INTERNAL MEDICINE

## 2023-11-27 PROCEDURE — 770020 HCHG ROOM/CARE - TELE (206)

## 2023-11-27 PROCEDURE — 700105 HCHG RX REV CODE 258

## 2023-11-27 PROCEDURE — A9270 NON-COVERED ITEM OR SERVICE: HCPCS

## 2023-11-27 DEVICE — IMPLANTABLE DEVICE: Type: IMPLANTABLE DEVICE | Status: FUNCTIONAL

## 2023-11-27 DEVICE — DEVICE CLSR 6FR HMST IMPL SLF STS PLUS ANGIOSEAL (10EA/CA): Type: IMPLANTABLE DEVICE | Status: FUNCTIONAL

## 2023-11-27 RX ORDER — VERAPAMIL HYDROCHLORIDE 2.5 MG/ML
INJECTION, SOLUTION INTRAVENOUS
Status: DISCONTINUED | OUTPATIENT
Start: 2023-11-27 | End: 2023-11-27 | Stop reason: HOSPADM

## 2023-11-27 RX ORDER — OMEPRAZOLE 20 MG/1
40 CAPSULE, DELAYED RELEASE ORAL 2 TIMES DAILY
Status: DISCONTINUED | OUTPATIENT
Start: 2023-11-27 | End: 2023-11-28 | Stop reason: HOSPADM

## 2023-11-27 RX ORDER — HYDROMORPHONE HYDROCHLORIDE 1 MG/ML
0.2 INJECTION, SOLUTION INTRAMUSCULAR; INTRAVENOUS; SUBCUTANEOUS
Status: DISCONTINUED | OUTPATIENT
Start: 2023-11-27 | End: 2023-11-27 | Stop reason: HOSPADM

## 2023-11-27 RX ORDER — HYDROMORPHONE HYDROCHLORIDE 1 MG/ML
0.1 INJECTION, SOLUTION INTRAMUSCULAR; INTRAVENOUS; SUBCUTANEOUS
Status: DISCONTINUED | OUTPATIENT
Start: 2023-11-27 | End: 2023-11-27 | Stop reason: HOSPADM

## 2023-11-27 RX ORDER — SODIUM CHLORIDE 9 MG/ML
INJECTION, SOLUTION INTRAVENOUS CONTINUOUS
Status: ACTIVE | OUTPATIENT
Start: 2023-11-27 | End: 2023-11-27

## 2023-11-27 RX ORDER — ASPIRIN 81 MG/1
81 TABLET ORAL DAILY
Status: DISCONTINUED | OUTPATIENT
Start: 2023-11-27 | End: 2023-11-28 | Stop reason: HOSPADM

## 2023-11-27 RX ORDER — ONDANSETRON 2 MG/ML
INJECTION INTRAMUSCULAR; INTRAVENOUS PRN
Status: DISCONTINUED | OUTPATIENT
Start: 2023-11-27 | End: 2023-11-27 | Stop reason: SURG

## 2023-11-27 RX ORDER — ACETAMINOPHEN 325 MG/1
650 TABLET ORAL EVERY 6 HOURS PRN
Status: DISCONTINUED | OUTPATIENT
Start: 2023-11-27 | End: 2023-11-28 | Stop reason: HOSPADM

## 2023-11-27 RX ORDER — MIRTAZAPINE 15 MG/1
15 TABLET, FILM COATED ORAL NIGHTLY
Status: DISCONTINUED | OUTPATIENT
Start: 2023-11-27 | End: 2023-11-28 | Stop reason: HOSPADM

## 2023-11-27 RX ORDER — HALOPERIDOL 5 MG/ML
1 INJECTION INTRAMUSCULAR
Status: DISCONTINUED | OUTPATIENT
Start: 2023-11-27 | End: 2023-11-27 | Stop reason: HOSPADM

## 2023-11-27 RX ORDER — VERAPAMIL HYDROCHLORIDE 240 MG/1
120 TABLET, FILM COATED, EXTENDED RELEASE ORAL 2 TIMES DAILY WITH MEALS
Status: DISCONTINUED | OUTPATIENT
Start: 2023-11-27 | End: 2023-11-28 | Stop reason: HOSPADM

## 2023-11-27 RX ORDER — MEPERIDINE HYDROCHLORIDE 25 MG/ML
12.5 INJECTION INTRAMUSCULAR; INTRAVENOUS; SUBCUTANEOUS
Status: DISCONTINUED | OUTPATIENT
Start: 2023-11-27 | End: 2023-11-27 | Stop reason: HOSPADM

## 2023-11-27 RX ORDER — DOCUSATE SODIUM 100 MG/1
200 CAPSULE, LIQUID FILLED ORAL DAILY
Status: DISCONTINUED | OUTPATIENT
Start: 2023-11-27 | End: 2023-11-28 | Stop reason: HOSPADM

## 2023-11-27 RX ORDER — LIDOCAINE HYDROCHLORIDE 20 MG/ML
INJECTION, SOLUTION INFILTRATION; PERINEURAL
Status: DISCONTINUED | OUTPATIENT
Start: 2023-11-27 | End: 2023-11-27 | Stop reason: HOSPADM

## 2023-11-27 RX ORDER — SODIUM CHLORIDE, SODIUM LACTATE, POTASSIUM CHLORIDE, CALCIUM CHLORIDE 600; 310; 30; 20 MG/100ML; MG/100ML; MG/100ML; MG/100ML
INJECTION, SOLUTION INTRAVENOUS CONTINUOUS
Status: ACTIVE | OUTPATIENT
Start: 2023-11-27 | End: 2023-11-27

## 2023-11-27 RX ORDER — OXYCODONE HCL 5 MG/5 ML
10 SOLUTION, ORAL ORAL
Status: DISCONTINUED | OUTPATIENT
Start: 2023-11-27 | End: 2023-11-27 | Stop reason: HOSPADM

## 2023-11-27 RX ORDER — TRAZODONE HYDROCHLORIDE 50 MG/1
50 TABLET ORAL
COMMUNITY
Start: 2023-10-23 | End: 2024-05-15

## 2023-11-27 RX ORDER — DIPHENHYDRAMINE HYDROCHLORIDE 50 MG/ML
25 INJECTION INTRAMUSCULAR; INTRAVENOUS EVERY 6 HOURS PRN
Status: DISCONTINUED | OUTPATIENT
Start: 2023-11-27 | End: 2023-11-28 | Stop reason: HOSPADM

## 2023-11-27 RX ORDER — HYDRALAZINE HYDROCHLORIDE 20 MG/ML
10 INJECTION INTRAMUSCULAR; INTRAVENOUS
Status: DISCONTINUED | OUTPATIENT
Start: 2023-11-27 | End: 2023-11-28 | Stop reason: HOSPADM

## 2023-11-27 RX ORDER — POLYETHYLENE GLYCOL 3350 17 G/17G
1 POWDER, FOR SOLUTION ORAL
Status: DISCONTINUED | OUTPATIENT
Start: 2023-11-27 | End: 2023-11-28 | Stop reason: HOSPADM

## 2023-11-27 RX ORDER — HEPARIN SODIUM,PORCINE 1000/ML
VIAL (ML) INJECTION PRN
Status: DISCONTINUED | OUTPATIENT
Start: 2023-11-27 | End: 2023-11-27 | Stop reason: SURG

## 2023-11-27 RX ORDER — ALBUTEROL SULFATE 2.5 MG/3ML
2.5 SOLUTION RESPIRATORY (INHALATION)
Status: DISCONTINUED | OUTPATIENT
Start: 2023-11-27 | End: 2023-11-27 | Stop reason: HOSPADM

## 2023-11-27 RX ORDER — HYDROMORPHONE HYDROCHLORIDE 1 MG/ML
0.4 INJECTION, SOLUTION INTRAMUSCULAR; INTRAVENOUS; SUBCUTANEOUS
Status: DISCONTINUED | OUTPATIENT
Start: 2023-11-27 | End: 2023-11-27 | Stop reason: HOSPADM

## 2023-11-27 RX ORDER — BISACODYL 10 MG
10 SUPPOSITORY, RECTAL RECTAL
Status: DISCONTINUED | OUTPATIENT
Start: 2023-11-27 | End: 2023-11-28 | Stop reason: HOSPADM

## 2023-11-27 RX ORDER — DIPHENHYDRAMINE HYDROCHLORIDE 50 MG/ML
12.5 INJECTION INTRAMUSCULAR; INTRAVENOUS
Status: DISCONTINUED | OUTPATIENT
Start: 2023-11-27 | End: 2023-11-27 | Stop reason: HOSPADM

## 2023-11-27 RX ORDER — AMOXICILLIN 250 MG
2 CAPSULE ORAL 2 TIMES DAILY
Status: DISCONTINUED | OUTPATIENT
Start: 2023-11-27 | End: 2023-11-28 | Stop reason: HOSPADM

## 2023-11-27 RX ORDER — CEFAZOLIN SODIUM 1 G/3ML
INJECTION, POWDER, FOR SOLUTION INTRAMUSCULAR; INTRAVENOUS PRN
Status: DISCONTINUED | OUTPATIENT
Start: 2023-11-27 | End: 2023-11-27 | Stop reason: SURG

## 2023-11-27 RX ORDER — FUROSEMIDE 10 MG/ML
20 INJECTION INTRAMUSCULAR; INTRAVENOUS
Status: DISCONTINUED | OUTPATIENT
Start: 2023-11-27 | End: 2023-11-28 | Stop reason: HOSPADM

## 2023-11-27 RX ORDER — DIPHENHYDRAMINE HCL 25 MG
25 TABLET ORAL NIGHTLY PRN
Status: DISCONTINUED | OUTPATIENT
Start: 2023-11-27 | End: 2023-11-28 | Stop reason: HOSPADM

## 2023-11-27 RX ORDER — OXYCODONE HCL 5 MG/5 ML
5 SOLUTION, ORAL ORAL
Status: DISCONTINUED | OUTPATIENT
Start: 2023-11-27 | End: 2023-11-27 | Stop reason: HOSPADM

## 2023-11-27 RX ORDER — DEXAMETHASONE SODIUM PHOSPHATE 4 MG/ML
INJECTION, SOLUTION INTRA-ARTICULAR; INTRALESIONAL; INTRAMUSCULAR; INTRAVENOUS; SOFT TISSUE PRN
Status: DISCONTINUED | OUTPATIENT
Start: 2023-11-27 | End: 2023-11-27 | Stop reason: SURG

## 2023-11-27 RX ORDER — POTASSIUM CHLORIDE 20 MEQ/1
20 TABLET, EXTENDED RELEASE ORAL DAILY
Status: DISCONTINUED | OUTPATIENT
Start: 2023-11-27 | End: 2023-11-28 | Stop reason: HOSPADM

## 2023-11-27 RX ORDER — BUPIVACAINE HYDROCHLORIDE 2.5 MG/ML
INJECTION, SOLUTION EPIDURAL; INFILTRATION; INTRACAUDAL
Status: DISCONTINUED | OUTPATIENT
Start: 2023-11-27 | End: 2023-11-27 | Stop reason: HOSPADM

## 2023-11-27 RX ORDER — ONDANSETRON 2 MG/ML
4 INJECTION INTRAMUSCULAR; INTRAVENOUS
Status: DISCONTINUED | OUTPATIENT
Start: 2023-11-27 | End: 2023-11-27 | Stop reason: HOSPADM

## 2023-11-27 RX ORDER — TRAZODONE HYDROCHLORIDE 50 MG/1
50 TABLET ORAL
Status: DISCONTINUED | OUTPATIENT
Start: 2023-11-27 | End: 2023-11-28 | Stop reason: HOSPADM

## 2023-11-27 RX ORDER — ONDANSETRON 2 MG/ML
4 INJECTION INTRAMUSCULAR; INTRAVENOUS EVERY 4 HOURS PRN
Status: DISCONTINUED | OUTPATIENT
Start: 2023-11-27 | End: 2023-11-28 | Stop reason: HOSPADM

## 2023-11-27 RX ADMIN — PROPOFOL 100 MG: 10 INJECTION, EMULSION INTRAVENOUS at 09:26

## 2023-11-27 RX ADMIN — CEFAZOLIN 2 G: 1 INJECTION, POWDER, FOR SOLUTION INTRAMUSCULAR; INTRAVENOUS at 09:30

## 2023-11-27 RX ADMIN — DEXAMETHASONE SODIUM PHOSPHATE 8 MG: 4 INJECTION INTRA-ARTICULAR; INTRALESIONAL; INTRAMUSCULAR; INTRAVENOUS; SOFT TISSUE at 09:58

## 2023-11-27 RX ADMIN — ASPIRIN 81 MG: 81 TABLET, COATED ORAL at 12:26

## 2023-11-27 RX ADMIN — OMEPRAZOLE 40 MG: 20 CAPSULE, DELAYED RELEASE ORAL at 17:22

## 2023-11-27 RX ADMIN — HYDRALAZINE HYDROCHLORIDE 10 MG: 20 INJECTION, SOLUTION INTRAMUSCULAR; INTRAVENOUS at 10:54

## 2023-11-27 RX ADMIN — VERAPAMIL HYDROCHLORIDE 120 MG: 240 TABLET, FILM COATED, EXTENDED RELEASE ORAL at 17:21

## 2023-11-27 RX ADMIN — FUROSEMIDE 20 MG: 10 INJECTION, SOLUTION INTRAVENOUS at 15:09

## 2023-11-27 RX ADMIN — DOCUSATE SODIUM 200 MG: 100 CAPSULE, LIQUID FILLED ORAL at 15:12

## 2023-11-27 RX ADMIN — SODIUM CHLORIDE, POTASSIUM CHLORIDE, SODIUM LACTATE AND CALCIUM CHLORIDE: 600; 310; 30; 20 INJECTION, SOLUTION INTRAVENOUS at 09:17

## 2023-11-27 RX ADMIN — HEPARIN SODIUM 10000 UNITS: 1000 INJECTION, SOLUTION INTRAVENOUS; SUBCUTANEOUS at 09:36

## 2023-11-27 RX ADMIN — VERAPAMIL HYDROCHLORIDE 120 MG: 240 TABLET, FILM COATED, EXTENDED RELEASE ORAL at 12:26

## 2023-11-27 RX ADMIN — ROCURONIUM BROMIDE 8080 MG: 10 INJECTION, SOLUTION INTRAVENOUS at 09:26

## 2023-11-27 RX ADMIN — MIRTAZAPINE 15 MG: 15 TABLET, FILM COATED ORAL at 21:02

## 2023-11-27 RX ADMIN — POTASSIUM CHLORIDE 20 MEQ: 1500 TABLET, EXTENDED RELEASE ORAL at 15:12

## 2023-11-27 RX ADMIN — ONDANSETRON 4 MG: 2 INJECTION INTRAMUSCULAR; INTRAVENOUS at 09:54

## 2023-11-27 RX ADMIN — SODIUM CHLORIDE: 9 INJECTION, SOLUTION INTRAVENOUS at 12:15

## 2023-11-27 ASSESSMENT — PAIN DESCRIPTION - PAIN TYPE
TYPE: SURGICAL PAIN
TYPE: ACUTE PAIN
TYPE: SURGICAL PAIN

## 2023-11-27 ASSESSMENT — COGNITIVE AND FUNCTIONAL STATUS - GENERAL
SUGGESTED CMS G CODE MODIFIER DAILY ACTIVITY: CH
STANDING UP FROM CHAIR USING ARMS: A LITTLE
WALKING IN HOSPITAL ROOM: TOTAL
MOVING TO AND FROM BED TO CHAIR: A LITTLE
DAILY ACTIVITIY SCORE: 24
MOBILITY SCORE: 15
MOVING FROM LYING ON BACK TO SITTING ON SIDE OF FLAT BED: A LITTLE
CLIMB 3 TO 5 STEPS WITH RAILING: TOTAL
SUGGESTED CMS G CODE MODIFIER MOBILITY: CK

## 2023-11-27 ASSESSMENT — PAIN SCALES - GENERAL: PAIN_LEVEL: 2

## 2023-11-27 NOTE — ANESTHESIA POSTPROCEDURE EVALUATION
Patient: Parvez Marmolejo    Procedure Summary       Date: 11/27/23 Room / Location: [place] [id number] / SURGERY [facility]    Anesthesia Start: 0917 Anesthesia Stop: 1030    Procedures:       TRANSCATHETER AORTIC VALVE REPLACEMENT (Bilateral: Groin)      ECHOCARDIOGRAM, TRANSESOPHAGEAL, INTRAOPERATIVE (Mouth) Diagnosis: (Severe aortic stenosis)    Surgeons: Walter Esparza M.D. Responsible Provider: Perez Bradshaw M.D.    Anesthesia Type: general ASA Status: 4            Final Anesthesia Type: general  Last vitals  BP   Blood Pressure : 129/74    Temp   36.2 °C (97.1 °F)    Pulse   86   Resp   16    SpO2   95 %      Anesthesia Post Evaluation    Patient location during evaluation: PACU  Patient participation: complete - patient participated  Level of consciousness: awake and alert  Pain score: 2    Airway patency: patent  Anesthetic complications: no  Cardiovascular status: hemodynamically stable  Respiratory status: acceptable  Hydration status: euvolemic    PONV: none          No notable events documented.     Nurse Pain Score: 0 (NPRS)

## 2023-11-27 NOTE — PROGRESS NOTES
Pt transferred to floor by PACU. Pt A&O2, VSS, on RA. All sites were checked and are clean dry and intact. Pt very hard of hearing at baseline. Pt updated on POC, all questions answered. Tele box on and monitor room notified. Pt oriented to room and educated to use call light for assistance.

## 2023-11-27 NOTE — ANESTHESIA PREPROCEDURE EVALUATION
Case: [id number] Date/Time: 11/27/23 0915    Procedures:       TRANSCATHETER AORTIC VALVE REPLACEMENT WITH POTENTIAL TRANSESOPHAGEAL ECHOCARDIOGRAM (Bilateral: Groin)      ECHOCARDIOGRAM, TRANSESOPHAGEAL, INTRAOPERATIVE    Anesthesia type: MAC    Pre-op diagnosis: SEVERE AORTIC STENOISIS    Location: [place] OR 19 / SURGERY [facility]    Surgeons: Walter Esparza M.D.            Relevant Problems   CARDIAC   (positive) Benign essential hypertension   (positive) Critical aortic valve stenosis      GI   (positive) Gastro-esophageal reflux disease without esophagitis       Physical Exam    Airway   Mallampati: II  TM distance: >3 FB  Neck ROM: full       Cardiovascular - normal exam  Rhythm: regular  Rate: normal  (-) murmur     Dental - normal exam           Pulmonary - normal exam  Breath sounds clear to auscultation     Abdominal    Neurological - normal exam                   Anesthesia Plan    ASA 4       Plan - general       Airway plan will be ETT  GRIFFIN Planned        Induction: intravenous    Postoperative Plan: Postoperative administration of opioids is intended.    Pertinent diagnostic labs and testing reviewed    Informed Consent:    Anesthetic plan and risks discussed with patient.    Use of blood products discussed with: patient whom consented to blood products.

## 2023-11-27 NOTE — ANESTHESIA PROCEDURE NOTES
GRIFFIN    Date/Time: 11/27/2023 9:27 AM    Performed by: Perez Bradshaw M.D.  Authorized by: Perez Bradshaw M.D.    Start Time:11/27/2023 9:27 AM  Preanesthetic Checklist: patient identified, IV checked, site marked, risks and benefits discussed, surgical consent, monitors and equipment checked, pre-op evaluation and timeout performed    Indication for GRIFFIN: diagnostic     Intubated: Yes  Bite Block: Yes  Heart Visualized: Yes  Insertion: atraumatic    **See FULL GRIFFIN report in patient's chart via CV Synapse**

## 2023-11-27 NOTE — ANESTHESIA TIME REPORT
Anesthesia Start and Stop Event Times       Date Time Event    11/27/2023 0917 Anesthesia Start     1030 Anesthesia Stop          Responsible Staff  11/27/23      Name Role Begin End    Walter Engle Anesth Tech 0917 1030    Perez Bradshaw M.D. Anesth 0917 1030          Overtime Reason:  no overtime (within assigned shift)    Comments:

## 2023-11-27 NOTE — OR NURSING
1024 - pt arrived to PACU 4A. R radial TR band reported to have 13ml of air. L groin site C&D with distal pulses palpable. Right groin site with tract ooze and pulses not palpable, pedal pulse absent and post tibial pulse heard by doppler - ANAYA Cat aware. Pt not oriented to time or situation - ANAYA Cat aware.    1028 - blood drawn and sent to lab    1041 - EKG at bedside    1042 - ANSELMO Cat, APRN at bedside    1044 - CXR at bedside    1128 - R radial arterial line removed, pressure held for 10 minutes and hemostasis achieved.    1130 - 3ml of air removed from TR band, no bleeding or hematoma noted.

## 2023-11-27 NOTE — OP REPORT
Operative Report    PreOp Diagnosis: Severe aortic stenosis      PostOp Diagnosis: Same       Procedure(s):  20 MM BALLOON VALVULOPLASTY  TRANSCATHETER AORTIC VALVE REPLACEMENT WITH A 29 MM ACHARYA S3- Wound Class: Clean  ECHOCARDIOGRAM, TRANSESOPHAGEAL, INTRAOPERATIVE - Wound Class: None    Surgeon(s):  HEVER Green M.D.    Anesthesiologist/Type of Anesthesia:  Anesthesiologist: Perez Bradshaw M.D.  Anesthesia Technician: Walter Engle/General    Surgical Staff:  Circulator: Catalino Patel R.N.; Sofie Trivedi R.N.  Perfusionist: Ten Gray  Scrub Person: Mariana Palmer  Radiology Technologist: Grisell de La Rosa Marquez; Sebastien Sorensen    Specimens removed if any:  * No specimens in log *    Estimated Blood Loss: minimal     Findings: trivial PVL    Complications: none immediate    Disposition: stable to the PACU    Procedure:    After informed consent was obtained, the patient was brought to the operating room and placed in the supine position on the operating table afterwards, general endotracheal anesthesia was induced by the anesthesia team.  Lines, catheters etc. were placed by the nursing and anesthesia team in the usual fashion.  Bony prominences were padded, joints placed in neutral position, and the patient was prepped and draped in the usual sterile fashion.  A timeout was performed.    We began the procedure by obtaining primary and secondary arterial access.  Primary arterial access was in the right common femoral artery, and secondary arterial access was in the right radial artery.  Access for temporary venous pacing wire was placed via the left common femoral vein.  The femoral vessels were accessed using a  Seldinger's technique with ultrasound guidance.  After obtaining access, 6 Yoruba sheaths were placed here.  The right radial artery was accessed by my cardiology colleague in the usual fashion.  Right femoral angiogram was performed to  confirm good positioning of our sheath.  Following that, the temporary pacing lead was floated from the left common femoral vein to the right ventricular apex.  Testing confirmed excellent capture.  Following that, a J-wire was advanced via the right radial catheter with an overriding pigtail catheter into the aortic root.  Angiography was performed there and are coplanar view was determined.  Heparinization was performed    Via the right femoral artery, access obtained with a J-wire.  The 6 Kosovan sheath was removed and 2 Perclose sutures were placed in the usual fashion.  Following those, an 8 Kosovan sheath was placed.  We again obtained access with a J-wire and pigtail catheter.  The J-wire was exchanged for a Lunderquist wire.  This allowed us to upsize our primary arterial access site to a 14 Kosovan sheath.  Once the sheath was in place, it was secured with an Ethibond stitch.  A J-wire with an overriding AL-1 catheter was advanced to the aortic root.  The J-wire was exchanged for a 0.035 straight wire which was used to cross the aortic valve.  The AL-1 catheter was then advanced into the left ventricle.  The straight wire was then exchanged for extra-stiff Amplatz deployment wire.  The AL-1 catheter was removed.      The 29 mm valve was prepped on the back table in the usual fashion and brought up for advancement into the aorta.  Prior to placement, the configuration of the valve was confirmed to be correct.  It was advanced into the abdominal aorta under fluoroscopic guidance.  The valve and balloon were docked in the usual fashion in the abdominal aorta.  It was then advanced across the aortic arch and down into the aortic root and across the aortic valve.  The valve was then prepped for deployment.  Following that, respirations were held, rapid ventricular pacing performed, and root angiogram shot.  The valve was then deployed us at a pressure of 7 ida, with 33 ml of contrast.  The balloon was deflated at  that point in the delivery system removed.  Post deployment echo revealed trivial PVL .    We proceeded with removal of all her wires, catheters etc.  The right femoral artery was secured using the 2 previously placed Perclose sutures.  Protamine was administered to reverse heparinization.  The right radial artery was treated with a TR band and pressure was held over the left common femoral venous site.  The patient tolerated procedure well, was taken to the CCU in stable condition.          11/27/2023 10:27 AM Keo Mallory D.O.

## 2023-11-27 NOTE — OP REPORT
"TRANSCATHETER AORTIC VALVE REPLACEMENT REPORT    Referring Provider:  [facility]    INTERVENTIONAL CARDIOLOGIST: Walter Esparza MD  CARDIAC SURGEON: Keo Mallory DO  ANESTHESIOLOGIST: Perez Bradshaw MD    ASSISTANT: None    IMPRESSIONS:  1. Successful implantation of a 29 Valencia Tameka S3 Ultra Resilia deployed at nominal volume via the transfemoral approach  2. Acute decompensated diastolic heart failure with LVEDP of 29 mmHg    Recommendations:  4 hour bedrest    Pre-procedure diagnosis: TAVR recommended by heart valve team. Stage D1 (symptomatic severe AS)  Post-procedure diagnosis: Same    Procedure performed  Pigtail placement/ascending aortography  Transvenous pacemaker  29 Valencia S3 Ultra Resilia  Perclose closure  Angioseal closure    Procedure Description  1. Access:   A) Valve Sheath: Right femoral, 16 Valencia eSheath. Micropuncture technique was utilized following local anesthesia with lidocaine.  Fluoroscopic guidance was utilized for femoral access Dynamic ultrasound was utilized to gain access.  B) Temporary pacemaker: 6 [place]orese Left femoral vein. Fluoroscopic guidance was utilized for femoral access Dynamic ultrasound was utilized to gain access.  C) Pigtail catheter: 5/6 [place]se right radial artery Micropuncture technique was utilized following local anesthesia with lidocaine.  A radial cocktail containing verapamil and saline was administered in the radial artery sheath    2. Procedure Description:  A TVP and pigtail catheter were placed and appropriate pacer function and implantation angle confirmed. The preclose was deployed followed by dilation of the tract with an 8F sheath. A standard 0.035\" J wire was used to deliver an catheter to the ascending aorta and then exchange for a 0.035\" Lunderquist wire. Over this wire the Valencia E sheath advanced into the descending aorta. AAn AL1 was placed in the ascending aorta and the valve crossed with a 0.035\" strait wire. The catheter was " "advanced into the LV apex and wire exchanged for a 0.035\" Amplatz Super Stiff wire.  Balloon aortic valvuloplasty was performed with a 26 mm balloon. Next a 29 mm Valencia Tameka S3 Ultra Resilia was deployed under rapid pacing with nominal volume  and 7 ida.  . Successful valve implantation confirmed by GRIFFIN. A pigtail catheter was used to perform left heart catheterization and LVEDP measured at 29 mmHg.  Protamine was administered and the Valencia sheath was removed from the body after reinsertion of the dilator. The perclose sutures were tightened hemostasis was achieved. The pigtail catheter was removed. The TVP was removed.    3. Hemostasis:   A) TAVR Sheath: Perclose by Preclose technique and Angioseal  B) TVP: Manual  C) Pigtail access: TR band    Technical Factors  1. Complications: None  2. Estimated Blood Loss: 50 cc  3. Specimens: None  4. Contrast Volume: 52 ml  5. Sedation: General Anesthesia  6. Echo: GRIFFIN    "

## 2023-11-27 NOTE — ANESTHESIA PROCEDURE NOTES
Arterial Line    Performed by: Perez Bradshaw M.D.  Authorized by: Perez Bradshaw M.D.    Start Time:  11/27/2023 8:45 AM  Localization: surface landmarks    Patient Location:  OR  Indication: continuous blood pressure monitoring        Catheter Size:  20 G  Seldinger Technique?: Yes    Site:  Radial artery  Line Secured:  Antimicrobial disc, tape and transparent dressing  Events: patient tolerated procedure well with no complications

## 2023-11-27 NOTE — ANESTHESIA PROCEDURE NOTES
Airway    Date/Time: 11/27/2023 9:26 AM    Performed by: Perez Bradshaw M.D.  Authorized by: Perez Bradshaw M.D.    Location:  OR  Urgency:  Elective  Indications for Airway Management:  Anesthesia      Spontaneous Ventilation: absent    Sedation Level:  Deep  Preoxygenated: Yes    Patient Position:  Sniffing  Final Airway Type:  Endotracheal airway  Final Endotracheal Airway:  ETT  Cuffed: Yes    Technique Used for Successful ETT Placement:  Direct laryngoscopy    Insertion Site:  Oral  Blade Type:  Ilda  Laryngoscope Blade/Videolaryngoscope Blade Size:  3  ETT Size (mm):  7.0  Measured from:  Teeth  ETT to Teeth (cm):  21  Placement Verified by: auscultation and capnometry    Cormack-Lehane Classification:  Grade I - full view of glottis  Number of Attempts at Approach:  1

## 2023-11-28 ENCOUNTER — APPOINTMENT (OUTPATIENT)
Dept: RADIOLOGY | Facility: MEDICAL CENTER | Age: 83
DRG: 266 | End: 2023-11-28
Payer: COMMERCIAL

## 2023-11-28 VITALS
SYSTOLIC BLOOD PRESSURE: 111 MMHG | HEART RATE: 86 BPM | BODY MASS INDEX: 24.05 KG/M2 | HEIGHT: 73 IN | OXYGEN SATURATION: 94 % | RESPIRATION RATE: 17 BRPM | TEMPERATURE: 99.1 F | WEIGHT: 181.44 LBS | DIASTOLIC BLOOD PRESSURE: 64 MMHG

## 2023-11-28 PROBLEM — I35.0 CRITICAL AORTIC VALVE STENOSIS: Status: RESOLVED | Noted: 2023-11-15 | Resolved: 2023-11-28

## 2023-11-28 LAB
ALBUMIN SERPL BCP-MCNC: 3.5 G/DL (ref 3.2–4.9)
ALBUMIN/GLOB SERPL: 1.1 G/DL
ALP SERPL-CCNC: 69 U/L (ref 30–99)
ALT SERPL-CCNC: 7 U/L (ref 2–50)
ANION GAP SERPL CALC-SCNC: 14 MMOL/L (ref 7–16)
AST SERPL-CCNC: 16 U/L (ref 12–45)
BILIRUB SERPL-MCNC: 0.6 MG/DL (ref 0.1–1.5)
BUN SERPL-MCNC: 12 MG/DL (ref 8–22)
CALCIUM ALBUM COR SERPL-MCNC: 9.2 MG/DL (ref 8.5–10.5)
CALCIUM SERPL-MCNC: 8.8 MG/DL (ref 8.5–10.5)
CHLORIDE SERPL-SCNC: 105 MMOL/L (ref 96–112)
CO2 SERPL-SCNC: 22 MMOL/L (ref 20–33)
CREAT SERPL-MCNC: 0.8 MG/DL (ref 0.5–1.4)
EKG IMPRESSION: NORMAL
ERYTHROCYTE [DISTWIDTH] IN BLOOD BY AUTOMATED COUNT: 41.8 FL (ref 35.9–50)
GFR SERPLBLD CREATININE-BSD FMLA CKD-EPI: 89 ML/MIN/1.73 M 2
GLOBULIN SER CALC-MCNC: 3.1 G/DL (ref 1.9–3.5)
GLUCOSE SERPL-MCNC: 90 MG/DL (ref 65–99)
HCT VFR BLD AUTO: 41.6 % (ref 42–52)
HGB BLD-MCNC: 13.8 G/DL (ref 14–18)
MCH RBC QN AUTO: 28.3 PG (ref 27–33)
MCHC RBC AUTO-ENTMCNC: 33.2 G/DL (ref 32.3–36.5)
MCV RBC AUTO: 85.4 FL (ref 81.4–97.8)
NT-PROBNP SERPL IA-MCNC: 741 PG/ML (ref 0–125)
PLATELET # BLD AUTO: 230 K/UL (ref 164–446)
PMV BLD AUTO: 8.9 FL (ref 9–12.9)
POTASSIUM SERPL-SCNC: 3.9 MMOL/L (ref 3.6–5.5)
PROT SERPL-MCNC: 6.6 G/DL (ref 6–8.2)
RBC # BLD AUTO: 4.87 M/UL (ref 4.7–6.1)
SODIUM SERPL-SCNC: 141 MMOL/L (ref 135–145)
WBC # BLD AUTO: 13.4 K/UL (ref 4.8–10.8)

## 2023-11-28 PROCEDURE — 93005 ELECTROCARDIOGRAM TRACING: CPT

## 2023-11-28 PROCEDURE — 97162 PT EVAL MOD COMPLEX 30 MIN: CPT

## 2023-11-28 PROCEDURE — 80053 COMPREHEN METABOLIC PANEL: CPT

## 2023-11-28 PROCEDURE — 99238 HOSP IP/OBS DSCHRG MGMT 30/<: CPT

## 2023-11-28 PROCEDURE — 700102 HCHG RX REV CODE 250 W/ 637 OVERRIDE(OP)

## 2023-11-28 PROCEDURE — 700111 HCHG RX REV CODE 636 W/ 250 OVERRIDE (IP): Mod: JZ

## 2023-11-28 PROCEDURE — A9270 NON-COVERED ITEM OR SERVICE: HCPCS

## 2023-11-28 PROCEDURE — 85027 COMPLETE CBC AUTOMATED: CPT

## 2023-11-28 PROCEDURE — 97535 SELF CARE MNGMENT TRAINING: CPT

## 2023-11-28 PROCEDURE — 83880 ASSAY OF NATRIURETIC PEPTIDE: CPT

## 2023-11-28 PROCEDURE — 71045 X-RAY EXAM CHEST 1 VIEW: CPT

## 2023-11-28 PROCEDURE — 93010 ELECTROCARDIOGRAM REPORT: CPT | Performed by: INTERNAL MEDICINE

## 2023-11-28 PROCEDURE — 36415 COLL VENOUS BLD VENIPUNCTURE: CPT

## 2023-11-28 PROCEDURE — 97602 WOUND(S) CARE NON-SELECTIVE: CPT

## 2023-11-28 RX ORDER — ASPIRIN 81 MG/1
81 TABLET ORAL DAILY
Qty: 100 TABLET | Refills: 3 | Status: SHIPPED | OUTPATIENT
Start: 2023-11-29

## 2023-11-28 RX ORDER — FUROSEMIDE 20 MG/1
20 TABLET ORAL DAILY
Qty: 30 TABLET | Refills: 0 | Status: SHIPPED | OUTPATIENT
Start: 2023-11-28 | End: 2024-01-10

## 2023-11-28 RX ORDER — POTASSIUM CHLORIDE 20 MEQ/1
20 TABLET, EXTENDED RELEASE ORAL DAILY
Qty: 30 TABLET | Refills: 0 | Status: SHIPPED | OUTPATIENT
Start: 2023-11-28 | End: 2024-01-10

## 2023-11-28 RX ORDER — POTASSIUM CHLORIDE 20 MEQ/1
20 TABLET, EXTENDED RELEASE ORAL DAILY
Qty: 30 TABLET | Refills: 0 | Status: SHIPPED | OUTPATIENT
Start: 2023-11-29 | End: 2023-12-05

## 2023-11-28 RX ORDER — FUROSEMIDE 20 MG/1
20 TABLET ORAL DAILY
Qty: 30 TABLET | Refills: 0 | Status: SHIPPED | OUTPATIENT
Start: 2023-11-28 | End: 2023-12-05

## 2023-11-28 RX ORDER — ASPIRIN 81 MG/1
81 TABLET, CHEWABLE ORAL DAILY
Qty: 100 TABLET | Refills: 3 | Status: SHIPPED | OUTPATIENT
Start: 2023-11-28 | End: 2023-12-05

## 2023-11-28 RX ADMIN — OMEPRAZOLE 40 MG: 20 CAPSULE, DELAYED RELEASE ORAL at 06:07

## 2023-11-28 RX ADMIN — POTASSIUM CHLORIDE 20 MEQ: 1500 TABLET, EXTENDED RELEASE ORAL at 06:07

## 2023-11-28 RX ADMIN — DOCUSATE SODIUM 50 MG AND SENNOSIDES 8.6 MG 2 TABLET: 8.6; 5 TABLET, FILM COATED ORAL at 06:07

## 2023-11-28 RX ADMIN — VERAPAMIL HYDROCHLORIDE 120 MG: 240 TABLET, FILM COATED, EXTENDED RELEASE ORAL at 08:38

## 2023-11-28 RX ADMIN — DOCUSATE SODIUM 200 MG: 100 CAPSULE, LIQUID FILLED ORAL at 06:07

## 2023-11-28 RX ADMIN — FUROSEMIDE 20 MG: 10 INJECTION, SOLUTION INTRAVENOUS at 06:08

## 2023-11-28 RX ADMIN — ASPIRIN 81 MG: 81 TABLET, COATED ORAL at 06:07

## 2023-11-28 ASSESSMENT — COGNITIVE AND FUNCTIONAL STATUS - GENERAL
TURNING FROM BACK TO SIDE WHILE IN FLAT BAD: A LOT
STANDING UP FROM CHAIR USING ARMS: A LITTLE
WALKING IN HOSPITAL ROOM: TOTAL
CLIMB 3 TO 5 STEPS WITH RAILING: TOTAL
MOBILITY SCORE: 12
SUGGESTED CMS G CODE MODIFIER MOBILITY: CL
MOVING FROM LYING ON BACK TO SITTING ON SIDE OF FLAT BED: A LITTLE
MOVING TO AND FROM BED TO CHAIR: A LOT

## 2023-11-28 ASSESSMENT — GAIT ASSESSMENTS: GAIT LEVEL OF ASSIST: UNABLE TO PARTICIPATE

## 2023-11-28 ASSESSMENT — FIBROSIS 4 INDEX: FIB4 SCORE: 1.76

## 2023-11-28 NOTE — CARE PLAN
Problem: Fall Risk  Goal: Patient will remain free from falls  Description: Target End Date:  Prior to discharge or change in level of care    Document interventions on the [employer] Esequiel Fall Risk Assessment    1.  Assess for fall risk factors  2.  Implement fall precautions  Outcome: Progressing     Problem: Knowledge Deficit - Standard  Goal: Patient and family/care givers will demonstrate understanding of plan of care, disease process/condition, diagnostic tests and medications  Description: Target End Date:  1-3 days or as soon as patient condition allows    Document in Patient Education    1.  Patient and family/caregiver oriented to unit, equipment, visitation policy and means for communicating concern  2.  Complete/review Learning Assessment  3.  Assess knowledge level of disease process/condition, treatment plan, diagnostic tests and medications  4.  Explain disease process/condition, treatment plan, diagnostic tests and medications  Outcome: Progressing   The patient is Watcher - Medium risk of patient condition declining or worsening    Shift Goals  Clinical Goals: POST TAVR  Patient Goals: rest  Family Goals: JUNO    Progress made toward(s) clinical / shift goals:  increase mobility, manage pain per MAR, fall precautions in place    Patient is not progressing towards the following goals:

## 2023-11-28 NOTE — PROGRESS NOTES
4 Eyes Skin Assessment Completed by DIGNA Holder and DIGNA Harris.    Head WDL  Ears WDL  Nose WDL  Mouth WDL  Neck WDL  Breast/Chest WDL  Shoulder Blades WDL  Spine WDL  (R) Arm/Elbow/Hand WDL  (L) Arm/Elbow/Hand WDL  Abdomen WDL  Groin Incision  Scrotum/Coccyx/Buttocks Skin tear on right buttocks  (R) Leg WDL  (L) Leg WDL  (R) Heel/Foot/Toe WDL  (L) Heel/Foot/Toe WDL          Devices In Places Tele Box, Blood Pressure Cuff, and Pulse Ox      Interventions In Place Pillows and Q2 Turns    Possible Skin Injury Yes    Pictures Uploaded Into Epic Yes  Wound Consult Placed Yes  RN Wound Prevention Protocol Ordered Yes

## 2023-11-28 NOTE — FACE TO FACE
Face to Face Supporting Documentation - Home Health    The encounter with this patient was in whole or in part the primary reason for home health admission.    Date of encounter:   Patient:                    MRN:                       YOB: 2021  Parvez Marmolejo  [id number]  1940     Home health to see patient for:  Skilled Nursing care for assessment, interventions & education, Wound Care, Physical Therapy evaluation and treatment, and Occupational therapy evaluation and treatment    Skilled need for:  Exacerbation of Chronic Disease State patient now status post TAVR and requiring additional assistance to manage his pre-existing pressure ulcer.    Skilled nursing interventions to include:  Wound Care    Homebound status evidenced by:  Need the aid of supportive devices such as crutches, canes, wheelchairs or walkers. Leaving home requires a considerable and taxing effort. There is a normal inability to leave the home.    Community Physician to provide follow up care: Pcp Not In Computer     Optional Interventions? No      I certify the face to face encounter for this home health care referral meets the CMS requirements and the encounter/clinical assessment with the patient was, in whole, or in part, for the medical condition(s) listed above, which is the primary reason for home health care. Based on my clinical findings: the service(s) are medically necessary, support the need for home health care, and the homebound criteria are met.  I certify that this patient has had a face to face encounter by myself.  ALTAGRACIA Alas. - NPI: [id number]

## 2023-11-28 NOTE — PROGRESS NOTES
Bedside report received and patient care assumed. Pt is resting in bed, A&O2, with no complaints of pain, and is on RA. Tele box on. All fall precautions are in place, belongings at bedside table.  Pt was updated on POC, no questions or concerns. Pt educated on use of call light for assistance.

## 2023-11-28 NOTE — PROGRESS NOTES
Report received, pt care assumed, tele box on. VSS, pt assessment complete. Pt aaox2, no signs of distress noted at this time. POC discussed with pt and verbalizes no questions. Pt denies any additional needs at this time. Bed in lowest position, bed alarm on, pt educated on fall risk and verbalized understanding, call light within reach, will continue to monitor.

## 2023-11-28 NOTE — THERAPY
"Physical Therapy   Initial Evaluation     Patient Name: Parvez Marmolejo  Age:  83 y.o., Sex:  male  Medical Record #: [id number]  Today's Date: 11/28/2023     Precautions  Precautions: Fall Risk;Cardiac Precautions (See Comments)  Comments: s/p TAVR 11/27/2023    Assessment  Patient is 83 y.o. male presenting to physical therapy s/p TAVR; PMH includes  severe symptomatic aortic stenosis, age-related physical debility, hypertension, paraplegia, impaired cognition. Pt is very [place] despite hearing aides, able to understand that pt resides alone, does not ambulate at baseline (manual WC is primary means of mobility), does not have stairs to enter his residence and has \"someone that comes in\" (does not appear to be a caregiver, possibly HH). Pt agreeable to work with PT, able to complete supine to sit transition with reliance on bed features. Once seated EOB pt completed WC set up and stand pivot transfer with CGA for safety.     Upon transition to WC, pt requested to get dressed in anticipation of DC home today. Very motivated to do from himself as he must upon return home but noted increased difficulty managing LEs during dressing. Required assist from PT to complete LB dressing which was frustrating to pt. Pt reported he \"normally gets dressed on the ground as its easier to roll side to side.\" Pt able to complete UB dressing without assist. Self propelled WC into hallway x50 ft then returned to room for lunch. Pt appears to be at functional baseline and capable of DC home when medically cleared. Would benefit from HH therapy follow up to ensure full return to baseline PLOF and provide home safety assessment and suggestions for energy conservation.       Plan  No further acute PT needs noted at this time.       DC Equipment Recommendations: None  Discharge Recommendations: Recommend home health for continued physical therapy services       Subjective    \"Am I going home now.\"     Objective     11/28/23 1328   Precautions " "  Precautions Fall Risk;Cardiac Precautions (See Comments)   Comments s/p TAVR 11/27/2023   Vitals   O2 Delivery Device None - Room Air   Pain 0 - 10 Group   Therapist Pain Assessment Nurse Notified;0   Prior Living Situation   Prior Services Home-Independent   Equipment Owned Wheelchair   Lives with - Patient's Self Care Capacity Alone and Able to Care For Self   Comments Pt is very [place] despite hearing aides. Difficult to ascertain if pt lives in a home or apt. Reports no steps and no caregiver support. Unclear how pt performs with IADLS, but reports he is independent with ADLS at baseline   Prior Level of Functional Mobility   Bed Mobility Independent   Transfer Status Independent   Assistive Devices Used Wheelchair   Wheelchair Independent   Comments pt reports he has been non ambulatory x 4 years. Normally dresses on the ground as \"its easier to roll side to side\"   Cognition    Cognition / Consciousness X   Speech/ Communication Hard of Hearing;Word Finding Impairment   Level of Consciousness Alert   Active ROM Upper Body   Active ROM Upper Body  WDL   Strength Upper Body   Upper Body Strength  WDL   Comments adequate for ADLs and functional mobility   Active ROM Lower Body    Active ROM Lower Body  X   Comments B LE paraplegia. uses UEs or momentum strategies to move LE   Strength Lower Body   Lower Body Strength  X   Comments as above   Lower Body Muscle Tone   Lower Body Muscle Tone  X   Comments hypotonic B LE   Other Treatments   Other Treatments Provided Pt requesting to dress in anticipation of DC. Requested to attempt to do for himself \"as he has to be able to upon DC home alone.\" Pt did require intermittent Min A for set up and completion of LB dressing, increased difficulty managing R LE compared to L. Able to complete UB dressing with SPV. For toileting, pt uses urinal at baseline and prefers to lie in sidelie on ground as easier to void into urinal. Only able to void minimally while seated during PT " leonides.   Balance Assessment   Sitting Balance (Static) Good   Sitting Balance (Dynamic) Good   Standing Balance (Static) Fair   Standing Balance (Dynamic) Fair -   Weight Shift Sitting Good   Comments partial stand for pivot transfer   Bed Mobility    Supine to Sit Standby Assist  (with extra time and reliance on bed rails)   Sit to Supine   (seated in WC at end of session)   Scooting Standby Assist   Rolling Standby Assist  (with bed rail support)   Gait Analysis   Gait Level Of Assist Unable to Participate   Comments Non Ambulatory at baseline   Functional Mobility   Sit to Stand Contact Guard Assist   Bed, Chair, Wheelchair Transfer Contact Guard Assist   Transfer Method Stand Pivot   Wheelchair Assist Supervised   Distance Wheelchair (Feet or Distance) 50   How much difficulty does the patient currently have...   Turning over in bed (including adjusting bedclothes, sheets and blankets)? 2   Sitting down on and standing up from a chair with arms (e.g., wheelchair, bedside commode, etc.) 3   Moving from lying on back to sitting on the side of the bed? 2   How much help from another person does the patient currently need...   Moving to and from a bed to a chair (including a wheelchair)? 3   Need to walk in a hospital room? 1   Climbing 3-5 steps with a railing? 1   6 clicks Mobility Score 12   Education Group   Education Provided Role of Physical Therapist   Role of Physical Therapist Patient Response Patient;Acceptance;Explanation;Verbal Demonstration   Additional Comments provided with TAVR activity handout   Problem List    Problems   (baseline functional impairments requiring extra time)   Anticipated Discharge Equipment and Recommendations   DC Equipment Recommendations None   Discharge Recommendations Recommend home health for continued physical therapy services

## 2023-11-28 NOTE — WOUND TEAM
[facility]  Inpatient Services  Initial Wound and Skin Care Evaluation    Admission Date: 11/27/2023     Last order of IP CONSULT TO WOUND CARE was found on 11/28/2023 from Hospital Encounter on 11/16/2023     HPI, PMH, SH: Reviewed    Past Surgical History:   Procedure Laterality Date    TRANSCATHETER AORTIC VALVE REPLACEMENT Bilateral 11/27/2023    Procedure: TRANSCATHETER AORTIC VALVE REPLACEMENT;  Surgeon: Walter Esparza M.D.;  Location: SURGERY [facility];  Service: Cardiac    ECHOCARDIOGRAM, TRANSESOPHAGEAL, INTRAOPERATIVE N/A 11/27/2023    Procedure: ECHOCARDIOGRAM, TRANSESOPHAGEAL, INTRAOPERATIVE;  Surgeon: Walter Esparza M.D.;  Location: SURGERY [facility];  Service: Cardiac     Social History     Tobacco Use    Smoking status: Never    Smokeless tobacco: Never   Substance Use Topics    Alcohol use: Not Currently     No chief complaint on file.    Diagnosis: Severe aortic stenosis [I35.0]    Unit where seen by Wound Team: T720/00     WOUND CONSULT RELATED TO:  Right buttock    WOUND TEAM PLAN OF CARE - Frequency of Follow-up:   Nursing to follow dressing orders written for wound care. Contact wound team if area fails to progress, deteriorates or with any questions/concerns if something comes up before next scheduled follow up (See below as to whether wound is following and frequency of wound follow up)   Not following, consult as needed  - right buttock    WOUND HISTORY:   Partial thickness wound to right buttock not over bony area.          WOUND ASSESSMENT/LDA  Wound 11/27/23 Partial Thickness Wound Buttocks Left (Active)   Date First Assessed/Time First Assessed: 11/27/23 1700   Present on Original Admission: Yes  Hand Hygiene Completed: Yes  Primary Wound Type: Partial Thickness Wound  Location: Buttocks  Laterality: Left      Assessments 11/28/2023 12:00 PM   Wound Image     Site Assessment Red   Periwound Assessment Clean;Dry;Intact;Scar tissue   Margins Defined edges;Attached  edges   Closure Secondary intention   Drainage Amount Small   Drainage Description Serosanguineous   Treatments Cleansed;Irrigation;Nonselective debridement;Site care   Wound Cleansing Soap and Water   Periwound Protectant No-sting Skin Prep   Dressing Status Clean;Dry;Intact   Dressing Changed New   Dressing Cleansing/Solutions Not Applicable   Dressing Options Hydrofiber Silver;Offloading Dressing - Sacral   Dressing Change/Treatment Frequency Every 48 hrs, and As Needed   NEXT Dressing Change/Treatment Date 11/30/23   NEXT Weekly Photo (Inpatient Only) 12/05/23   Wound Team Following Not following   Non-staged Wound Description Partial thickness   Wound Length (cm) 6 cm   Wound Width (cm) 2.5 cm   Wound Depth (cm) 0.1 cm   Wound Surface Area (cm^2) 15 cm^2   Wound Volume (cm^3) 1.5 cm^3   Shape irregular oval   Wound Odor None        Vascular:    KAE:   No results found.    Lab Values:    Lab Results   Component Value Date/Time    WBC 13.4 (H) 11/28/2023 03:22 AM    RBC 4.87 11/28/2023 03:22 AM    HEMOGLOBIN 13.8 (L) 11/28/2023 03:22 AM    HEMATOCRIT 41.6 (L) 11/28/2023 03:22 AM         Culture Results show:  No results found for this or any previous visit (from the past 720 hour(s)).    Pain Level/Medicated:  None, Tolerated without pain medication       INTERVENTIONS BY WOUND TEAM:  Chart and images reviewed. Discussed with bedside RN. All areas of concern (based on picture review, LDA review and discussion with bedside RN) have been thoroughly assessed. Documentation of areas based on significant findings. This RN in to assess patient. Performed standard wound care which includes appropriate positioning, dressing removal and non-selective debridement. Pictures and measurements obtained weekly if/when required.    Wound:  Right buttock  Preparation for Dressing removal: Removed without difficulty  Cleansed/Non-selectively Debrided with:  Wound cleanser and Gauze  Cindy wound: Cleansed with Wound cleanser and  Gauze, Prepped with No Sting  Primary Dressing:  Aquacel Ag hydrofiber  Secondary (Outer) Dressing: sacral offloading foam    Advanced Wound Care Discharge Planning  Number of Clinicians necessary to complete wound care: 1  Is patient requiring IV pain medications for dressing changes:  No   Length of time for dressing change 5 min. (This does not include chart review, pre-medication time, set up, clean up or time spent charting.)    Interdisciplinary consultation: Patient, Bedside RN (Gallo), Selina SPAULDING (Wound RN).  Pressure injury and staging reviewed with Selina SPAULDING (Wound RN).    EVALUATION / RATIONALE FOR TREATMENT:     Date:  11/28/23  Wound Status:  Initial evaluation    Partial thickness red moist wound bed.  Not over a bony area of the sacrum nor IT.  Aquacel Ag Hydrofiber applied to manage bioburden, absorb exudate, and maintain a moist wound environment without laterally wicking exudate therefore reducing rio-wound maceration.  Shearing and deep skin tear likely.   Bilateral heels and sacrum assessed, pink intact and blanching.  Pictures in chart.          Goals: Steady decrease in wound area and depth weekly.    NURSING PLAN OF CARE ORDERS:  Dressing changes: See Dressing Care orders    NUTRITION RECOMMENDATIONS   Wound Team Recommendations:  N/A    DIET ORDERS (From admission to next 24h)       Start     Ordered    11/27/23 1027  Diet Order Diet: Cardiac  ALL MEALS        Question:  Diet:  Answer:  Cardiac    11/27/23 1026                    PREVENTATIVE INTERVENTIONS:    Q shift Nayan - performed per nursing policy  Q shift pressure point assessments - performed per nursing policy    Surface/Positioning  Standard/trauma mattress - Currently in Place    Offloading/Redistribution  Sacral offloading dressing (Silicone dressing) - Currently in Place  Heel offloading dressing (Silicone dressing) - Applied this Visit      Respiratory  N/A    Containment/Moisture Prevention    Dri-ana pad - Currently in  Place    Mobilization      Unable to assess     Anticipated discharge plans:  Self/Family Care and Home Health Care        Vac Discharge Needs:  Vac Discharge plan is purely a recommendation from wound team and not a requirement for discharge unless otherwise stated by physician.  Not Applicable Pt not on a wound vac

## 2023-11-28 NOTE — DISCHARGE SUMMARY
PRIMARY DISCHARGE DIAGNOSIS: Status post transcatheter aortic valve replacement.    DISCHARGE DIAGNOSIS:  S/P TAVR    PROCEDURES/TESTIN. Successful transcatheter aortic valve replacement (TAVR) with #29 Valencia Tameka S3 Ultra Resilia deployed at nominal volume via the transfemoral approach on 2023 under general anethesia  2. Intraoperative transesophageal echocardiogram showing   1)  GRIFFIN for TAVR  2)  Post tavr measurements were not able to be aquired due to hiatal   hernia.  Color flow Doppler showed no significant leaks around TAVR   with excellent landing.  Suggest TTE for definitive measurements.  3)  Mild to Moderate MR  4)  EF 50%  3. CXR on 2023 showing   1.  Interstitial pulmonary parenchymal prominence suggest chronic underlying lung disease, component of interstitial edema and/or infiltrates not excluded.  2.  Cardiomegaly  3.  Atherosclerosis  4. EKG on 2023   1.  Interstitial pulmonary parenchymal prominence suggests chronic), component interstitial edema and/or infiltrates not excluded.  2.  Cardiomegaly  3.  Atherosclerosis      Labs   Lab Results   Component Value Date/Time    SODIUM 141 2023 03:22 AM    POTASSIUM 3.9 2023 03:22 AM    CHLORIDE 105 2023 03:22 AM    CO2 22 2023 03:22 AM    GLUCOSE 90 2023 03:22 AM    BUN 12 2023 03:22 AM    CREATININE 0.80 2023 03:22 AM       Lab Results   Component Value Date/Time    PROTHROMBTM 14.2 2023 08:08 AM    INR 1.09 2023 08:08 AM       Lab Results   Component Value Date/Time    WBC 13.4 (H) 2023 03:22 AM    RBC 4.87 2023 03:22 AM    HEMOGLOBIN 13.8 (L) 2023 03:22 AM    HEMATOCRIT 41.6 (L) 2023 03:22 AM    MCV 85.4 2023 03:22 AM    MCH 28.3 2023 03:22 AM    MCHC 33.2 2023 03:22 AM    MPV 8.9 (L) 2023 03:22 AM         HOSPITAL COURSE: The patient is a pleasant 83 year old male with severe symptomatic aortic stenosis, age-related  physical debility, hypertension, paraplegia, impaired cognition. Due to the patient's symptoms, the patient underwent successful TAVR described as above. Post-procedure, the patient did well. They did require IV diuresis during their stay and will continue on oral diuretics post-operatively. Acute diastolic heart failure exacerbation as evidenced by: signs and symptoms shortness of breath, AN, weakness/fatigue; Echocardiogram showing severe AS and moderate MR; corroborated by elevated BNP (741), pulmonary edema on chest x-ray, and elevated LVEDP of 29.  Physical therapy evaluated him prior to discharge and recommended home PT/OT.  Additionally he has a pressure ulcer that he has been dealing with for over a year, I will reorder home health and wound care to assist him in managing this.  No further events were noted during their stay. They are now off oxygen and are to be discharged to home.    DISCHARGE MEDICATIONS:      Medication List        ASK your doctor about these medications        Instructions   docusate calcium 240 MG Caps  Commonly known as: Surfak   Take 240 mg by mouth every day.  Dose: 240 mg     mirtazapine 15 MG Tabs  Commonly known as: Remeron   Take 15 mg by mouth every evening.  Dose: 15 mg     naproxen 500 MG Tabs  Commonly known as: Naprosyn   Take 500 mg by mouth 2 times a day as needed (PAIN).  Dose: 500 mg     omeprazole 40 MG delayed-release capsule  Commonly known as: PriLOSEC   Take 40 mg by mouth 2 times a day.  Dose: 40 mg     ONE-A-DAY 50 PLUS PO   Take 1 Tablet by mouth every day.  Dose: 1 Tablet     traZODone 50 MG Tabs  Commonly known as: Desyrel   Take 50 mg by mouth every evening as needed for Sleep.  Dose: 50 mg     verapamil  MG Tbcr  Commonly known as: Calan SR   Take 120 mg by mouth 2 times a day.  Dose: 120 mg              DISCHARGE INSTRUCTIONS: They are given discharge instructions on potential post-operative complications and symptoms to watch out for. Their groin  sites were checked and were clean, dry, and intact. Patient or family to notify us for any complications noted on the discharge instructions. They will follow up with myself, ANAYA Bonilla, on Tuesday in our cardiology office. They will need labs before their follow up appointment. They will then follow up with Dr. Esparza with a repeat echocardiogram in one month for post TAVR assessment.      Future Appointments   Date Time Provider Department Center   12/5/2023  1:15 PM JOSE MARIA Alas None   1/2/2024  2:15 PM [facility] EXAM 9 ECHO [facility]   1/10/2024  2:20 PM SOFIA Hughes None   11/27/2024 12:15 PM [facility] EXAM 9 [facility]   12/4/2024  3:40 PM SOFIA Hughes None       Discharge time spent with patient was 28 minutes.

## 2023-11-28 NOTE — DISCHARGE PLANNING
Care Transition Team Assessment    LMSW met with pt at bedside to complete assessment. Pt and able to verify the information on the face sheet. Pt lives with alone. Prior to this hospitalization pt was independent at home with ADLs and IADLs. Pt uses wheelchair at baseline. Pt reported that his support comes from his daughter who lives in [place], Radha Wiseman, and son who lives in [facility], Alexander Marmolejo. Pt could not provide phone numbers due to not having a cell phone. Keeps address book at home. Pt denies any SA or MH concerns. Pt is hard of hearing.    SW met with pt at bedside regarding PT rec of HH. Per pt, he does not want HH at this moment.    Information Source  Orientation Level: Oriented to place, Oriented to person, Disoriented to time, Disoriented to situation  Information Given By: Patient  Who is responsible for making decisions for patient? : Patient    Readmission Evaluation  Is this a readmission?: No    Elopement Risk  Legal Hold: No  Ambulatory or Self Mobile in Wheelchair: No-Not an Elopement Risk  Elopement Risk: Not at Risk for Elopement    Interdisciplinary Discharge Planning  Lives with - Patient's Self Care Capacity: Alone and Able to Care For Self  Support Systems: Children  Prior Services: Home-Independent    Discharge Preparedness  What is your plan after discharge?: Home with help  What are your discharge supports?: Child    Vision / Hearing Impairment  Right Eye Vision: Impaired, Wears Glasses  Left Eye Vision: Impaired, Wears Glasses  Hearing Impairment: Left Ear, Hearing Device(s) Available    Domestic Abuse  Physical Abuse or Sexual Abuse: No  Verbal Abuse or Emotional Abuse: No    Psychological Assessment  History of Substance Abuse: None  History of Psychiatric Problems: No    Anticipated Discharge Information  Discharge Disposition: Discharged to home/self care (01)

## 2023-11-29 ENCOUNTER — TELEPHONE (OUTPATIENT)
Dept: HEALTH INFORMATION MANAGEMENT | Facility: OTHER | Age: 83
End: 2023-11-29

## 2023-11-29 NOTE — PROGRESS NOTES
Called Taxi cab for patient.He needs a wheelchair taxi and they only have two running right now. I was told itll be a two hour wait. Pt in room resting.

## 2023-12-04 ENCOUNTER — NON-PROVIDER VISIT (OUTPATIENT)
Dept: CARDIOLOGY | Facility: MEDICAL CENTER | Age: 83
End: 2023-12-04
Attending: INTERNAL MEDICINE
Payer: COMMERCIAL

## 2023-12-05 ENCOUNTER — TELEPHONE (OUTPATIENT)
Dept: CARDIOLOGY | Facility: MEDICAL CENTER | Age: 83
End: 2023-12-05
Payer: COMMERCIAL

## 2023-12-05 ENCOUNTER — APPOINTMENT (OUTPATIENT)
Dept: CARDIOLOGY | Facility: MEDICAL CENTER | Age: 83
End: 2023-12-05
Payer: COMMERCIAL

## 2023-12-05 NOTE — LETTER
December 6, 2023        Parvez Marmolejo  [address] [place] [address]        Dear Parvez,      We have been unable to reach you regarding rescheduling your 1 week post TAVR follow up appointment with Madison JULIEN     Please call our office at [phone] to reschedule your visit.           Sincerely,           NOBLE Jc, RN  Structural Heart Program Nurse Coordinator

## 2023-12-05 NOTE — PROGRESS NOTES
Parvez Marmolejo is a 83 y.o. male here for a Non-Provider Visit for wound check. Patient arrived at [facility] wanting to have left wrist checked, reporting bruising was not getting better.    This RN observed site, old bruising, slight tenderness, no drainage.     Patient had TAVR 11/27/23, Patient has follow up appt  at 1:15pm.    Wheeled patient down to [facility], discussed with patient we have also scheduled him an er tomorrow for his appt.   Patient verbalized understanding.

## 2023-12-05 NOTE — TELEPHONE ENCOUNTER
Attempted to contact patient regarding 1 week follow up appointment. Unable to leave message as voicemail not set up. Will try again at a later time.

## 2023-12-06 NOTE — TELEPHONE ENCOUNTER
Attempted to contact patient. Unable to leave message. Letter generated and placed in mail to patient.

## 2023-12-12 ENCOUNTER — TELEPHONE (OUTPATIENT)
Dept: CARDIOLOGY | Facility: MEDICAL CENTER | Age: 83
End: 2023-12-12
Payer: COMMERCIAL

## 2023-12-12 NOTE — TELEPHONE ENCOUNTER
Patient unfortunately no-showed his FU post TAVR appt again with Madison JULIEN yesterday. Patient is very hard of hearing and [facility]. Reached out to his cardiologist Cristine PEOPLES to notify her. She will email him to check in. Advised we can reschedule him at [facility], or if easier for the patient, [facility] and [facility]in [facility]. Cristine PEOPLES states she will reach out to him with options on where to FU. Provided Cristine my direct phone number to call back with any updates.

## 2024-01-02 ENCOUNTER — HOSPITAL ENCOUNTER (OUTPATIENT)
Dept: CARDIOLOGY | Facility: MEDICAL CENTER | Age: 84
End: 2024-01-02
Attending: INTERNAL MEDICINE
Payer: COMMERCIAL

## 2024-01-02 DIAGNOSIS — I35.0 NONRHEUMATIC AORTIC (VALVE) STENOSIS: ICD-10-CM

## 2024-01-02 PROCEDURE — 93306 TTE W/DOPPLER COMPLETE: CPT

## 2024-01-03 LAB
LV EJECT FRACT  99904: 67
LV EJECT FRACT MOD 2C 99903: 63.4
LV EJECT FRACT MOD 4C 99902: 67.18
LV EJECT FRACT MOD BP 99901: 65.96

## 2024-01-03 PROCEDURE — 93306 TTE W/DOPPLER COMPLETE: CPT | Mod: 26 | Performed by: INTERNAL MEDICINE

## 2024-01-10 ENCOUNTER — DOCUMENTATION (OUTPATIENT)
Dept: CARDIOLOGY | Facility: MEDICAL CENTER | Age: 84
End: 2024-01-10

## 2024-01-10 ENCOUNTER — OFFICE VISIT (OUTPATIENT)
Dept: CARDIOLOGY | Facility: MEDICAL CENTER | Age: 84
End: 2024-01-10
Attending: INTERNAL MEDICINE
Payer: COMMERCIAL

## 2024-01-10 VITALS
WEIGHT: 190 LBS | BODY MASS INDEX: 25.18 KG/M2 | SYSTOLIC BLOOD PRESSURE: 102 MMHG | DIASTOLIC BLOOD PRESSURE: 58 MMHG | HEIGHT: 73 IN | OXYGEN SATURATION: 97 % | HEART RATE: 82 BPM

## 2024-01-10 DIAGNOSIS — I10 BENIGN ESSENTIAL HYPERTENSION: ICD-10-CM

## 2024-01-10 DIAGNOSIS — I77.89 ENLARGED THORACIC AORTA (HCC): ICD-10-CM

## 2024-01-10 DIAGNOSIS — Z95.2 S/P TAVR (TRANSCATHETER AORTIC VALVE REPLACEMENT): ICD-10-CM

## 2024-01-10 DIAGNOSIS — G82.22 PARAPLEGIA, INCOMPLETE (HCC): ICD-10-CM

## 2024-01-10 PROBLEM — I50.31 ACUTE DIASTOLIC HF (HEART FAILURE) (HCC): Status: RESOLVED | Noted: 2023-11-27 | Resolved: 2024-01-10

## 2024-01-10 PROCEDURE — 3078F DIAST BP <80 MM HG: CPT | Performed by: INTERNAL MEDICINE

## 2024-01-10 PROCEDURE — 99214 OFFICE O/P EST MOD 30 MIN: CPT | Performed by: INTERNAL MEDICINE

## 2024-01-10 PROCEDURE — 3074F SYST BP LT 130 MM HG: CPT | Performed by: INTERNAL MEDICINE

## 2024-01-10 PROCEDURE — 99212 OFFICE O/P EST SF 10 MIN: CPT | Performed by: INTERNAL MEDICINE

## 2024-01-10 RX ORDER — AMOXICILLIN 500 MG/1
2000 CAPSULE ORAL PRN
Qty: 12 CAPSULE | Refills: 3 | Status: SHIPPED | OUTPATIENT
Start: 2024-01-10

## 2024-01-10 ASSESSMENT — FIBROSIS 4 INDEX: FIB4 SCORE: 2.13

## 2024-01-10 NOTE — PROGRESS NOTES
"CARDIOLOGY STRUCTURAL HEART FOLLOWUP    PCP: Pcp Pt States None  [facility]    1. S/P TAVR (transcatheter aortic valve replacement)    2. Paraplegia, incomplete (HCC)    3. Benign essential hypertension    4. Enlarged thoracic aorta (HCC)        Parvez Marmolejo is well with regards to recent TAVR.  Unexpectedly there was an enlargement of the thoracic aorta.  I suspect artifact of imaging on the echocardiogram but we will repeat the study in 3 months.  Some of his dizziness may be related to furosemide side effect and this as well as potassium was discontinued.  I did give him information about BPPV.    Follow up: 1 year    History: Parvez Marmolejo is a 83 y.o. male with history of spinal stenosis and debility after suffering a fall in the 1980s presenting for follow-up of TAVR.  He had asymptomatic very severe aortic stenosis and underwent valve replacement on 11/27/2023.  He did well.  A postoperative echo showed new enlargement of the ascending aorta to 4.4 cm but a normally functioning TAVR device.     PE:  /58 (BP Location: Left arm, Patient Position: Sitting, BP Cuff Size: Adult)   Pulse 82   Ht 1.854 m (6' 1\")   Wt 86.2 kg (190 lb)   SpO2 97%   BMI 25.07 kg/m²   GEN: NAD  CARDIAC: Regular. Normal S1, S2, Systolic murmur.   VASCULATURE: Normal carotid upstroke  RESP: Clear to auscultation bilaterally  ABD: Soft, non-tender, non-distended  EXT: No edema  NEURO: No focal deficit        Notable and past medical history.    No past medical history on file.  No Known Allergies  Outpatient Encounter Medications as of 1/10/2024   Medication Sig Dispense Refill    amoxicillin (AMOXIL) 500 MG Cap Take 4 Capsules by mouth as needed (take thirty to sixty minutes prior to dental appointments). 12 Capsule 3    aspirin 81 MG EC tablet Take 1 Tablet by mouth every day. (Patient taking differently: Take 81 mg by mouth 2 (two) times a day. Two pills twice a day) 100 Tablet 3    traZODone (DESYREL) " "50 MG Tab Take 50 mg by mouth every evening as needed for Sleep.      omeprazole (PRILOSEC) 40 MG delayed-release capsule Take 40 mg by mouth 2 times a day.      verapamil ER (CALAN SR) 240 MG Tab CR Take 120 mg by mouth 2 times a day.      mirtazapine (REMERON) 15 MG Tab Take 15 mg by mouth every evening.      naproxen (NAPROSYN) 500 MG Tab Take 500 mg by mouth 2 times a day as needed (PAIN).      Multiple Vitamins-Minerals (ONE-A-DAY 50 PLUS PO) Take 1 Tablet by mouth every day.      [DISCONTINUED] furosemide (LASIX) 20 MG Tab Take 1 Tablet by mouth every day. 30 Tablet 0    [DISCONTINUED] potassium chloride SA (KDUR) 20 MEQ Tab CR Take 1 Tablet by mouth every day. 30 Tablet 0    docusate calcium (SURFAK) 240 MG Cap Take 240 mg by mouth every day. (Patient not taking: Reported on 1/10/2024)       No facility-administered encounter medications on file as of 1/10/2024.     Social History     Socioeconomic History    Marital status:      Spouse name: Not on file    Number of children: Not on file    Years of education: Not on file    Highest education level: Not on file   Occupational History    Not on file   Tobacco Use    Smoking status: Never    Smokeless tobacco: Never   Vaping Use    Vaping Use: Not on file   Substance and Sexual Activity    Alcohol use: Not Currently    Drug use: Never    Sexual activity: Not on file   Other Topics Concern    Not on file   Social History Narrative    Not on file     Social Determinants of Health     Financial Resource Strain: Not on file   Food Insecurity: Not on file   Transportation Needs: Not on file   Physical Activity: Not on file   Stress: Not on file   Social Connections: Not on file   Intimate Partner Violence: Not on file   Housing Stability: Not on file       Studies  No results found for: \"CHOLSTRLTOT\", \"LDL\", \"HDL\", \"TRIGLYCERIDE\"    Lab Results   Component Value Date/Time    SODIUM 141 11/28/2023 03:22 AM    POTASSIUM 3.9 11/28/2023 03:22 AM    CHLORIDE 105 " 11/28/2023 03:22 AM    CO2 22 11/28/2023 03:22 AM    GLUCOSE 90 11/28/2023 03:22 AM    BUN 12 11/28/2023 03:22 AM    CREATININE 0.80 11/28/2023 03:22 AM     Lab Results   Component Value Date/Time    ALKPHOSPHAT 69 11/28/2023 03:22 AM    ASTSGOT 16 11/28/2023 03:22 AM    ALTSGPT 7 11/28/2023 03:22 AM    TBILIRUBIN 0.6 11/28/2023 03:22 AM      @CBC@    Chief Complaint   Patient presents with    Other     F/v dx: 1 month post TAVR       ROS:   10 point review systems is otherwise negative except as per the HPI

## 2024-01-11 NOTE — PROGRESS NOTES
Valve Program Functional Assessment:     KCCQ12   1a) Showering/bathin  1b) Walking 1 block on ground: 1  1c) Hurrying or joggin  2) Swellin  3) Fatigue: 5  4) Shortness of breath: 7  5) Sleep sitting up: 5  6) Limited enjoyment of life: 5  7) Spend the rest of your life with HF: 5  8a) Hobbies, recreational activities:3  8b) Working or doing household chores:2  8c) Visiting family or friends: 3

## 2024-04-03 ENCOUNTER — TELEPHONE (OUTPATIENT)
Dept: CARDIOLOGY | Facility: MEDICAL CENTER | Age: 84
End: 2024-04-03
Payer: COMMERCIAL

## 2024-04-03 NOTE — TELEPHONE ENCOUNTER
Call outcome:  left message with Maru    Message: Left message with Maru to clarify if authorization is RFS form.

## 2024-04-03 NOTE — TELEPHONE ENCOUNTER
BE    Caller: Maru from [employer]      Topic/issue: Maru called because patient has an echocardiogram scheduled but [facility]. She was asking if someone can submit the auth.     Please advise.     Callback Number: see routing comments    Thank you,     Saida GASPAR

## 2024-04-04 NOTE — TELEPHONE ENCOUNTER
Phone number called: [phone]     Call outcome: Spoke with Maru and confirmed that our office didn't complete the auth request for the patient's echo, we defer to auth team for that. She states she will work on getting that completed for the patient.

## 2024-04-04 NOTE — TELEPHONE ENCOUNTER
BE    Caller: Maru with [facility] authorization department    Topic/issue: Maru returning Tea's call, but Tea is out today. Please contact at number listed below regarding this issue.    Callback Number: [phone]     Thank you,  Lauren DRAKE

## 2024-04-10 ENCOUNTER — APPOINTMENT (OUTPATIENT)
Dept: CARDIOLOGY | Facility: MEDICAL CENTER | Age: 84
End: 2024-04-10
Attending: INTERNAL MEDICINE
Payer: COMMERCIAL

## 2024-11-20 ENCOUNTER — TELEPHONE (OUTPATIENT)
Dept: CARDIOLOGY | Facility: MEDICAL CENTER | Age: 84
End: 2024-11-20
Payer: COMMERCIAL

## 2024-11-20 DIAGNOSIS — Z95.2 S/P TAVR (TRANSCATHETER AORTIC VALVE REPLACEMENT): ICD-10-CM

## 2024-11-20 NOTE — TELEPHONE ENCOUNTER
Received notification that authorization for upcoming echo was denied by the [facility]. Denial letter states [facility].     STAT request for echo images and report [facility]ed to the [facility] at [phone]. Receipt confirmed.     11/27/2024 scheduled echo cancelled. Will update patient once images/report received.

## 2024-11-20 NOTE — TELEPHONE ENCOUNTER
Echo report dated 11/19/2024 received and scanned into Epic.    Response from [facility] indicates request for images would not be fulfilled. Called and spoke to Sophia in HIM who states there are no images available for completed echo. Confirmed that echo was completed at [facility]. Requested to speak directly with the filmroom for further discussion. Call transferred to the .    Spoke with Anastasiya in the filmroom. She states RN would need to speak with echo department directly and transferred call.    Unable to speak with representative in the echo department. Left detailed message requesting call back to discuss.

## 2024-11-21 ENCOUNTER — HOSPITAL ENCOUNTER (OUTPATIENT)
Dept: RADIOLOGY | Facility: MEDICAL CENTER | Age: 84
End: 2024-11-21
Payer: COMMERCIAL

## 2024-11-21 NOTE — TELEPHONE ENCOUNTER
Called and spoke to HIM representative Hortencia regarding image request. She states she will look into this and give RN a call back with update.

## 2024-11-21 NOTE — TELEPHONE ENCOUNTER
Called and spoke to Tammi in the outpatient filmroom. She states images were received and she will upload to Epic now. Will ensure images are uploaded shortly.    Called and left detailed message for patient advising that 11/27/2024 echo had been cancelled. Upcoming appointment details left in message with request to return call with any questions or concerns.

## 2024-11-27 ENCOUNTER — APPOINTMENT (OUTPATIENT)
Dept: CARDIOLOGY | Facility: MEDICAL CENTER | Age: 84
End: 2024-11-27
Attending: INTERNAL MEDICINE
Payer: COMMERCIAL

## 2024-12-10 ENCOUNTER — TELEPHONE (OUTPATIENT)
Dept: CARDIOLOGY | Facility: MEDICAL CENTER | Age: 84
End: 2024-12-10
Payer: COMMERCIAL

## 2025-01-21 ENCOUNTER — TELEPHONE (OUTPATIENT)
Dept: CARDIOLOGY | Facility: MEDICAL CENTER | Age: 85
End: 2025-01-21
Payer: COMMERCIAL

## 2025-01-21 NOTE — TELEPHONE ENCOUNTER
Called pt in regards to 1 year post Op TAVR procedure questionnaire needed to be done. Pt did not answer, LVM           BRENT Jeong, [facility]  Cc: BRENT Scruggs,    Did you want to try to reschedule a one year follow up visit for this patient. He has had his echo already.  I could use a phone call for a KCCQ as his visit.  His window extends to 1/24/25.      Thank you,  Demetra

## (undated) DEVICE — DECANTER FLD BLS - (50/CA)

## (undated) DEVICE — COVER LIGHT HANDLE ALC PLUS DISP (18EA/BX)

## (undated) DEVICE — ARM BAND RADIAL TR BAND (5EA/BX)

## (undated) DEVICE — CATHETER PIGTAIL 6FR 145 (5EA/BX)

## (undated) DEVICE — SYR ANGIO CNRST INJ HI-PRS 3W 65 - (10EA/CA)"

## (undated) DEVICE — GLOVE BIOGEL SZ 8.5 SURGICAL PF LTX - (50PR/BX 4BX/CA)

## (undated) DEVICE — SENSOR OXIMETER ADULT SPO2 RD SET (20EA/BX)

## (undated) DEVICE — CANISTER SUCTION 3000ML MECHANICAL FILTER AUTO SHUTOFF MEDI-VAC NONSTERILE LF DISP  (40EA/CA)

## (undated) DEVICE — TUBING CLEARLINK DUO-VENT - C-FLO (48EA/CA)

## (undated) DEVICE — WIRE GUIDE AES .035 260CM WITH 3MM J TIP"

## (undated) DEVICE — ELECTRODE RADIOLUCNT SOLID GEL DEFIB PADS (12EA/CA)

## (undated) DEVICE — GUIDEWIRE STARTER STRAIGHT FIXED CORE .035 150CM 4 STRAIGHT PTFE/HEPARIN COATED (10/BX)

## (undated) DEVICE — IV TUBING HI-FLO RATE W/CLAMP (50/CA)

## (undated) DEVICE — GOWN SURGICAL XX-LARGE - (28EA/CA) SIRUS NON REINFORCED

## (undated) DEVICE — SET EXTENSION WITH 2 PORTS (48EA/CA) ***PART #2C8610 IS A SUBSTITUTE*****

## (undated) DEVICE — CABLE TEMPORARY PACING

## (undated) DEVICE — DEVICE INFLATION ATRION NOVALFEX TRANSFEMORAL SYSTEM (1EA)

## (undated) DEVICE — KIT RETROFIT PROBE COVERS (24EA/EA)

## (undated) DEVICE — ELECTRODE DUAL RETURN W/ CORD - (50/PK)

## (undated) DEVICE — CRIMPER CATHETER EDWARDS DISPOSABLE (1EA)

## (undated) DEVICE — GLOVESZ 8.5 BIOGEL PI MICRO - PF LF (50PR/BX)

## (undated) DEVICE — Device

## (undated) DEVICE — DEVICE INFLATION NOVAFLEX ATRION LOCK SYRINGE 29MM 38ML (1EA)

## (undated) DEVICE — COVER FOOT UNIVERSAL DISP. - (25EA/CA)

## (undated) DEVICE — PACK TAVR (3EA/CA)

## (undated) DEVICE — SUTURE DEVICE CLOSURE REPAIR SYSTEM PERCLOSE PROSTYLE (10EA/BX)

## (undated) DEVICE — SUCTION INSTRUMENT YANKAUER BULBOUS TIP W/O VENT (50EA/CA)

## (undated) DEVICE — CATHETER 6FR AL1 100CM (5/BX)

## (undated) DEVICE — GOWN SURGEONS X-LARGE - DISP. (30/CA)

## (undated) DEVICE — DRAPE CLEAR W/ELASTIC BAND RAD CARM 40 X40" (20EA/CA)"

## (undated) DEVICE — BLADE SURGICAL #11 - (50/BX)

## (undated) DEVICE — INTRODUCER SHEATH 6FR 2.5CM - DILATOR PROTRUDING (10/BX)

## (undated) DEVICE — CATHETER EP 6FR 90CM FEM BP J CRV (J-TIP)

## (undated) DEVICE — GLIDESHEATH SLENDER NITINOL KIT .021 GW 6FR 10CM SINGLE WALL

## (undated) DEVICE — TOWELS CLOTH SURGICAL - (4/PK 20PK/CA)

## (undated) DEVICE — DRAPE MAYO STAND - (30/CA)

## (undated) DEVICE — INTRODUCER CATHETER  DILATOR PROTRUDING 8FR 2.5CM (10EA/BX)

## (undated) DEVICE — STOPCOCK IV 400 PSI 3W ROT (50EA/BX)

## (undated) DEVICE — TOWEL STOP TIMEOUT SAFETY FLAG (40EA/CA)

## (undated) DEVICE — GLOVE SIZE 6.5  SURGEON ACCELERATOR FREE GREEN (50PR/BX)

## (undated) DEVICE — CHLORAPREP 26 ML APPLICATOR - ORANGE TINT(25/CA)

## (undated) DEVICE — SUTURE  0 ETHIBOND CT-1 30 IN (36PK/BX)

## (undated) DEVICE — LACTATED RINGERS INJ 1000 ML - (14EA/CA 60CA/PF)

## (undated) DEVICE — COVER LIGHT HANDLE FLEXIBLE - SOFT (2EA/PK 80PK/CA)

## (undated) DEVICE — WIRE GUIDE LUNDQST.035X180 - TSMG-35-180-4-LES ORDER BY BOX (5EA/BX)